# Patient Record
Sex: MALE | Race: WHITE | NOT HISPANIC OR LATINO
[De-identification: names, ages, dates, MRNs, and addresses within clinical notes are randomized per-mention and may not be internally consistent; named-entity substitution may affect disease eponyms.]

---

## 2017-03-21 ENCOUNTER — NON-APPOINTMENT (OUTPATIENT)
Age: 82
End: 2017-03-21

## 2017-03-21 ENCOUNTER — LABORATORY RESULT (OUTPATIENT)
Age: 82
End: 2017-03-21

## 2017-03-21 ENCOUNTER — APPOINTMENT (OUTPATIENT)
Dept: INTERNAL MEDICINE | Facility: CLINIC | Age: 82
End: 2017-03-21

## 2017-03-21 VITALS — HEIGHT: 61.5 IN

## 2017-03-21 VITALS
HEART RATE: 72 BPM | HEIGHT: 61.5 IN | BODY MASS INDEX: 24.79 KG/M2 | OXYGEN SATURATION: 99 % | WEIGHT: 133 LBS | SYSTOLIC BLOOD PRESSURE: 122 MMHG | DIASTOLIC BLOOD PRESSURE: 78 MMHG

## 2017-03-21 DIAGNOSIS — H91.93 UNSPECIFIED HEARING LOSS, BILATERAL: ICD-10-CM

## 2017-03-21 DIAGNOSIS — H72.92 UNSPECIFIED PERFORATION OF TYMPANIC MEMBRANE, LEFT EAR: ICD-10-CM

## 2017-03-21 DIAGNOSIS — Z82.49 FAMILY HISTORY OF ISCHEMIC HEART DISEASE AND OTHER DISEASES OF THE CIRCULATORY SYSTEM: ICD-10-CM

## 2017-03-21 RX ORDER — ASPIRIN 81 MG/1
81 TABLET, CHEWABLE ORAL
Refills: 0 | Status: ACTIVE | COMMUNITY

## 2017-03-28 ENCOUNTER — MEDICATION RENEWAL (OUTPATIENT)
Age: 82
End: 2017-03-28

## 2017-03-28 LAB
ALBUMIN SERPL ELPH-MCNC: 4.1 G/DL
ALP BLD-CCNC: 67 U/L
ALT SERPL-CCNC: 11 U/L
ANION GAP SERPL CALC-SCNC: 17 MMOL/L
AST SERPL-CCNC: 15 U/L
BASOPHILS # BLD AUTO: 0.14 K/UL
BASOPHILS NFR BLD AUTO: 1.8 %
BILIRUB SERPL-MCNC: 0.5 MG/DL
BUN SERPL-MCNC: 31 MG/DL
CALCIUM SERPL-MCNC: 9.9 MG/DL
CHLORIDE SERPL-SCNC: 104 MMOL/L
CHOLEST SERPL-MCNC: 162 MG/DL
CHOLEST/HDLC SERPL: 3.8 RATIO
CO2 SERPL-SCNC: 22 MMOL/L
CREAT SERPL-MCNC: 1.22 MG/DL
EOSINOPHIL # BLD AUTO: 0.21 K/UL
EOSINOPHIL NFR BLD AUTO: 2.7 %
GLUCOSE SERPL-MCNC: 97 MG/DL
HCT VFR BLD CALC: 46 %
HDLC SERPL-MCNC: 43 MG/DL
HGB BLD-MCNC: 15.4 G/DL
LDLC SERPL CALC-MCNC: 96 MG/DL
LYMPHOCYTES # BLD AUTO: 1.67 K/UL
LYMPHOCYTES NFR BLD AUTO: 21.4 %
MAN DIFF?: NORMAL
MCHC RBC-ENTMCNC: 33.5 GM/DL
MCHC RBC-ENTMCNC: 35.9 PG
MCV RBC AUTO: 107.2 FL
MONOCYTES # BLD AUTO: 0.49 K/UL
MONOCYTES NFR BLD AUTO: 6.3 %
NEUTROPHILS # BLD AUTO: 4.87 K/UL
NEUTROPHILS NFR BLD AUTO: 62.4 %
PLATELET # BLD AUTO: 299 K/UL
POTASSIUM SERPL-SCNC: 5.3 MMOL/L
PROT SERPL-MCNC: 7.4 G/DL
RBC # BLD: 4.29 M/UL
RBC # FLD: 14.1 %
SODIUM SERPL-SCNC: 143 MMOL/L
TRIGL SERPL-MCNC: 116 MG/DL
TSH SERPL-ACNC: 2.29 UIU/ML
WBC # FLD AUTO: 7.81 K/UL

## 2017-06-05 ENCOUNTER — MEDICATION RENEWAL (OUTPATIENT)
Age: 82
End: 2017-06-05

## 2017-06-09 ENCOUNTER — MEDICATION RENEWAL (OUTPATIENT)
Age: 82
End: 2017-06-09

## 2017-10-05 ENCOUNTER — RX RENEWAL (OUTPATIENT)
Age: 82
End: 2017-10-05

## 2018-02-07 ENCOUNTER — MEDICATION RENEWAL (OUTPATIENT)
Age: 83
End: 2018-02-07

## 2018-03-19 ENCOUNTER — RX RENEWAL (OUTPATIENT)
Age: 83
End: 2018-03-19

## 2018-04-12 ENCOUNTER — LABORATORY RESULT (OUTPATIENT)
Age: 83
End: 2018-04-12

## 2018-04-12 ENCOUNTER — APPOINTMENT (OUTPATIENT)
Dept: INTERNAL MEDICINE | Facility: CLINIC | Age: 83
End: 2018-04-12
Payer: MEDICARE

## 2018-04-12 ENCOUNTER — NON-APPOINTMENT (OUTPATIENT)
Age: 83
End: 2018-04-12

## 2018-04-12 VITALS
HEIGHT: 61 IN | WEIGHT: 131.39 LBS | TEMPERATURE: 97.7 F | OXYGEN SATURATION: 96 % | SYSTOLIC BLOOD PRESSURE: 142 MMHG | HEART RATE: 63 BPM | DIASTOLIC BLOOD PRESSURE: 69 MMHG | BODY MASS INDEX: 24.81 KG/M2

## 2018-04-12 DIAGNOSIS — Z87.09 PERSONAL HISTORY OF OTHER DISEASES OF THE RESPIRATORY SYSTEM: ICD-10-CM

## 2018-04-12 PROCEDURE — 36415 COLL VENOUS BLD VENIPUNCTURE: CPT

## 2018-04-12 PROCEDURE — 93000 ELECTROCARDIOGRAM COMPLETE: CPT

## 2018-04-12 PROCEDURE — G0439: CPT

## 2018-04-12 PROCEDURE — G0444 DEPRESSION SCREEN ANNUAL: CPT

## 2018-04-12 RX ORDER — OSELTAMIVIR PHOSPHATE 75 MG/1
75 CAPSULE ORAL
Qty: 10 | Refills: 0 | Status: DISCONTINUED | COMMUNITY
Start: 2018-02-07 | End: 2018-04-12

## 2018-04-13 LAB
BASOPHILS # BLD AUTO: 0.15 K/UL
BASOPHILS NFR BLD AUTO: 1.8 %
EOSINOPHIL # BLD AUTO: 0.17 K/UL
EOSINOPHIL NFR BLD AUTO: 2 %
HCT VFR BLD CALC: 41.5 %
HGB BLD-MCNC: 14.8 G/DL
IMM GRANULOCYTES NFR BLD AUTO: 0.1 %
LYMPHOCYTES # BLD AUTO: 2.24 K/UL
LYMPHOCYTES NFR BLD AUTO: 26.5 %
MAN DIFF?: NORMAL
MCHC RBC-ENTMCNC: 35.7 GM/DL
MCHC RBC-ENTMCNC: 37.4 PG
MCV RBC AUTO: 104.8 FL
MONOCYTES # BLD AUTO: 0.76 K/UL
MONOCYTES NFR BLD AUTO: 9 %
NEUTROPHILS # BLD AUTO: 5.11 K/UL
NEUTROPHILS NFR BLD AUTO: 60.6 %
PLATELET # BLD AUTO: 299 K/UL
RBC # BLD: 3.96 M/UL
RBC # FLD: 14.7 %
WBC # FLD AUTO: 8.44 K/UL

## 2018-04-16 LAB
ALBUMIN SERPL ELPH-MCNC: 4.1 G/DL
ALP BLD-CCNC: 59 U/L
ALT SERPL-CCNC: 6 U/L
ANION GAP SERPL CALC-SCNC: 13 MMOL/L
AST SERPL-CCNC: 19 U/L
BILIRUB SERPL-MCNC: 0.6 MG/DL
BUN SERPL-MCNC: 27 MG/DL
CALCIUM SERPL-MCNC: 9.9 MG/DL
CHLORIDE SERPL-SCNC: 104 MMOL/L
CHOLEST SERPL-MCNC: 160 MG/DL
CHOLEST/HDLC SERPL: 4.2 RATIO
CO2 SERPL-SCNC: 24 MMOL/L
CREAT SERPL-MCNC: 1.23 MG/DL
GLUCOSE SERPL-MCNC: 91 MG/DL
HDLC SERPL-MCNC: 38 MG/DL
LDLC SERPL CALC-MCNC: 99 MG/DL
POTASSIUM SERPL-SCNC: 5.2 MMOL/L
PROT SERPL-MCNC: 7.9 G/DL
SODIUM SERPL-SCNC: 141 MMOL/L
TRIGL SERPL-MCNC: 113 MG/DL
TSH SERPL-ACNC: 2.46 UIU/ML

## 2018-06-11 ENCOUNTER — RX RENEWAL (OUTPATIENT)
Age: 83
End: 2018-06-11

## 2018-09-26 ENCOUNTER — RX RENEWAL (OUTPATIENT)
Age: 83
End: 2018-09-26

## 2018-10-07 ENCOUNTER — FORM ENCOUNTER (OUTPATIENT)
Age: 83
End: 2018-10-07

## 2018-10-08 ENCOUNTER — APPOINTMENT (OUTPATIENT)
Dept: ORTHOPEDIC SURGERY | Facility: CLINIC | Age: 83
End: 2018-10-08
Payer: MEDICARE

## 2018-10-08 ENCOUNTER — OUTPATIENT (OUTPATIENT)
Dept: OUTPATIENT SERVICES | Facility: HOSPITAL | Age: 83
LOS: 1 days | End: 2018-10-08
Payer: MEDICARE

## 2018-10-08 ENCOUNTER — APPOINTMENT (OUTPATIENT)
Dept: RADIOLOGY | Facility: CLINIC | Age: 83
End: 2018-10-08

## 2018-10-08 VITALS — RESPIRATION RATE: 16 BRPM | HEIGHT: 61 IN | BODY MASS INDEX: 24.73 KG/M2 | WEIGHT: 131 LBS

## 2018-10-08 PROCEDURE — 73564 X-RAY EXAM KNEE 4 OR MORE: CPT

## 2018-10-08 PROCEDURE — 20611 DRAIN/INJ JOINT/BURSA W/US: CPT | Mod: 50

## 2018-10-08 PROCEDURE — 99204 OFFICE O/P NEW MOD 45 MIN: CPT | Mod: 25

## 2018-10-08 PROCEDURE — 73564 X-RAY EXAM KNEE 4 OR MORE: CPT | Mod: 26,50

## 2018-12-28 ENCOUNTER — MEDICATION RENEWAL (OUTPATIENT)
Age: 83
End: 2018-12-28

## 2019-03-11 ENCOUNTER — RX RENEWAL (OUTPATIENT)
Age: 84
End: 2019-03-11

## 2019-05-01 ENCOUNTER — LABORATORY RESULT (OUTPATIENT)
Age: 84
End: 2019-05-01

## 2019-05-01 ENCOUNTER — APPOINTMENT (OUTPATIENT)
Dept: INTERNAL MEDICINE | Facility: CLINIC | Age: 84
End: 2019-05-01
Payer: MEDICARE

## 2019-05-01 ENCOUNTER — NON-APPOINTMENT (OUTPATIENT)
Age: 84
End: 2019-05-01

## 2019-05-01 VITALS
OXYGEN SATURATION: 96 % | SYSTOLIC BLOOD PRESSURE: 122 MMHG | HEIGHT: 61 IN | DIASTOLIC BLOOD PRESSURE: 63 MMHG | HEART RATE: 71 BPM | WEIGHT: 132 LBS | BODY MASS INDEX: 24.92 KG/M2

## 2019-05-01 DIAGNOSIS — Z71.89 OTHER SPECIFIED COUNSELING: ICD-10-CM

## 2019-05-01 PROCEDURE — G0444 DEPRESSION SCREEN ANNUAL: CPT | Mod: 59

## 2019-05-01 PROCEDURE — 90670 PCV13 VACCINE IM: CPT

## 2019-05-01 PROCEDURE — 93000 ELECTROCARDIOGRAM COMPLETE: CPT

## 2019-05-01 PROCEDURE — 99497 ADVNCD CARE PLAN 30 MIN: CPT | Mod: 25

## 2019-05-01 PROCEDURE — G0439: CPT

## 2019-05-01 PROCEDURE — 36415 COLL VENOUS BLD VENIPUNCTURE: CPT

## 2019-05-01 PROCEDURE — G0009: CPT

## 2019-05-01 NOTE — HEALTH RISK ASSESSMENT
[Very Good] : ~his/her~  mood as very good [No falls in past year] : Patient reported no falls in the past year [0] : 2) Feeling down, depressed, or hopeless: Not at all (0) [None] : None [With Significant Other] : lives with significant other [Retired] : retired [] :  [Feels Safe at Home] : Feels safe at home [Fully functional (bathing, dressing, toileting, transferring, walking, feeding)] : Fully functional (bathing, dressing, toileting, transferring, walking, feeding) [Fully functional (using the telephone, shopping, preparing meals, housekeeping, doing laundry, using] : Fully functional and needs no help or supervision to perform IADLs (using the telephone, shopping, preparing meals, housekeeping, doing laundry, using transportation, managing medications and managing finances) [Designated Healthcare Proxy] : Designated healthcare proxy [Relationship: ___] : Relationship: [unfilled] [Name: ___] : Health Care Proxy's Name: [unfilled]  [] : No [[id number]] : 0 [Reports changes in hearing] : Reports no changes in hearing [Language] : denies difficulty with language [Change in mental status noted] : No change in mental status noted [Reports changes in dental health] : Reports no changes in dental health [Reports changes in vision] : Reports no changes in vision [FreeTextEntry4] : [place] given for review and signing.  16 minutes d/w pt and wife

## 2019-05-01 NOTE — HISTORY OF PRESENT ILLNESS
[de-identified] : No nausea, vomiting, diarrhea, and constipation. No chest pain or shortness of breath.\par Walks daily.  Slower then when younger.  More careful.  Appetite good.

## 2019-05-01 NOTE — PHYSICAL EXAM
[No Acute Distress] : no acute distress [Well Nourished] : well nourished [Well Developed] : well developed [Well-Appearing] : well-appearing [Normal Sclera/Conjunctiva] : normal sclera/conjunctiva [PERRL] : pupils equal round and reactive to light [EOMI] : extraocular movements intact [Normal Outer Ear/Nose] : the outer ears and nose were normal in appearance [Normal Oropharynx] : the oropharynx was normal [No JVD] : no jugular venous distention [Supple] : supple [No Lymphadenopathy] : no lymphadenopathy [Thyroid Normal, No Nodules] : the thyroid was normal and there were no nodules present [No Respiratory Distress] : no respiratory distress  [Clear to Auscultation] : lungs were clear to auscultation bilaterally [No Accessory Muscle Use] : no accessory muscle use [Normal Rate] : normal rate  [Normal S1, S2] : normal S1 and S2 [Regular Rhythm] : with a regular rhythm [No Murmur] : no murmur heard [No Carotid Bruits] : no carotid bruits [No Abdominal Bruit] : a ~M bruit was not heard ~T in the abdomen [No Varicosities] : no varicosities [No Edema] : there was no peripheral edema [Pedal Pulses Present] : the pedal pulses are present [No Extremity Clubbing/Cyanosis] : no extremity clubbing/cyanosis [No Palpable Aorta] : no palpable aorta [Soft] : abdomen soft [Non Tender] : non-tender [Non-distended] : non-distended [No Masses] : no abdominal mass palpated [No HSM] : no HSM [Normal Bowel Sounds] : normal bowel sounds [Normal Posterior Cervical Nodes] : no posterior cervical lymphadenopathy [Normal Anterior Cervical Nodes] : no anterior cervical lymphadenopathy [No CVA Tenderness] : no CVA  tenderness [No Spinal Tenderness] : no spinal tenderness [Grossly Normal Strength/Tone] : grossly normal strength/tone [No Joint Swelling] : no joint swelling [No Rash] : no rash [Normal Gait] : normal gait [Coordination Grossly Intact] : coordination grossly intact [No Focal Deficits] : no focal deficits [Deep Tendon Reflexes (DTR)] : deep tendon reflexes were 2+ and symmetric [Normal Insight/Judgement] : insight and judgment were intact [Normal Affect] : the affect was normal

## 2019-05-08 LAB
ALBUMIN SERPL ELPH-MCNC: 4 G/DL
ALP BLD-CCNC: 64 U/L
ALT SERPL-CCNC: 13 U/L
ANION GAP SERPL CALC-SCNC: 12 MMOL/L
AST SERPL-CCNC: 16 U/L
BASOPHILS # BLD AUTO: 0.12 K/UL
BASOPHILS NFR BLD AUTO: 1.4 %
BILIRUB SERPL-MCNC: 0.5 MG/DL
BUN SERPL-MCNC: 25 MG/DL
CALCIUM SERPL-MCNC: 9 MG/DL
CHLORIDE SERPL-SCNC: 108 MMOL/L
CHOLEST SERPL-MCNC: 145 MG/DL
CHOLEST/HDLC SERPL: 3.8 RATIO
CO2 SERPL-SCNC: 23 MMOL/L
CREAT SERPL-MCNC: 1.06 MG/DL
EOSINOPHIL # BLD AUTO: 0.21 K/UL
EOSINOPHIL NFR BLD AUTO: 2.5 %
GLUCOSE SERPL-MCNC: 96 MG/DL
HCT VFR BLD CALC: 42.3 %
HDLC SERPL-MCNC: 38 MG/DL
HGB BLD-MCNC: 14.2 G/DL
IMM GRANULOCYTES NFR BLD AUTO: 0.2 %
LDLC SERPL CALC-MCNC: 85 MG/DL
LYMPHOCYTES # BLD AUTO: 1.83 K/UL
LYMPHOCYTES NFR BLD AUTO: 22.1 %
MAN DIFF?: NORMAL
MCHC RBC-ENTMCNC: 33.6 GM/DL
MCHC RBC-ENTMCNC: 36.5 PG
MCV RBC AUTO: 108.7 FL
MONOCYTES # BLD AUTO: 0.78 K/UL
MONOCYTES NFR BLD AUTO: 9.4 %
NEUTROPHILS # BLD AUTO: 5.32 K/UL
NEUTROPHILS NFR BLD AUTO: 64.4 %
PLATELET # BLD AUTO: 289 K/UL
POTASSIUM SERPL-SCNC: 4 MMOL/L
PROT SERPL-MCNC: 7 G/DL
RBC # BLD: 3.89 M/UL
RBC # FLD: 13.9 %
SODIUM SERPL-SCNC: 143 MMOL/L
TRIGL SERPL-MCNC: 108 MG/DL
TSH SERPL-ACNC: 2.03 UIU/ML
WBC # FLD AUTO: 8.28 K/UL

## 2019-06-04 ENCOUNTER — RX RENEWAL (OUTPATIENT)
Age: 84
End: 2019-06-04

## 2019-06-10 ENCOUNTER — RX RENEWAL (OUTPATIENT)
Age: 84
End: 2019-06-10

## 2019-10-03 ENCOUNTER — MEDICATION RENEWAL (OUTPATIENT)
Age: 84
End: 2019-10-03

## 2019-11-20 ENCOUNTER — APPOINTMENT (OUTPATIENT)
Dept: ORTHOPEDIC SURGERY | Facility: CLINIC | Age: 84
End: 2019-11-20
Payer: MEDICARE

## 2019-11-20 VITALS — WEIGHT: 135 LBS | BODY MASS INDEX: 23.92 KG/M2 | HEIGHT: 63 IN

## 2019-11-20 PROCEDURE — 99214 OFFICE O/P EST MOD 30 MIN: CPT

## 2019-11-20 PROCEDURE — 73562 X-RAY EXAM OF KNEE 3: CPT | Mod: 50

## 2019-12-02 ENCOUNTER — RX RENEWAL (OUTPATIENT)
Age: 84
End: 2019-12-02

## 2020-02-11 ENCOUNTER — OUTPATIENT (OUTPATIENT)
Dept: OUTPATIENT SERVICES | Facility: HOSPITAL | Age: 85
LOS: 1 days | End: 2020-02-11
Payer: MEDICARE

## 2020-02-11 VITALS
OXYGEN SATURATION: 98 % | HEART RATE: 59 BPM | SYSTOLIC BLOOD PRESSURE: 163 MMHG | TEMPERATURE: 98 F | HEIGHT: 75 IN | RESPIRATION RATE: 18 BRPM | DIASTOLIC BLOOD PRESSURE: 66 MMHG | WEIGHT: 132.06 LBS

## 2020-02-11 DIAGNOSIS — Z98.890 OTHER SPECIFIED POSTPROCEDURAL STATES: Chronic | ICD-10-CM

## 2020-02-11 DIAGNOSIS — M17.11 UNILATERAL PRIMARY OSTEOARTHRITIS, RIGHT KNEE: ICD-10-CM

## 2020-02-11 DIAGNOSIS — Z01.818 ENCOUNTER FOR OTHER PREPROCEDURAL EXAMINATION: ICD-10-CM

## 2020-02-11 LAB
ALBUMIN SERPL ELPH-MCNC: 3.5 G/DL — SIGNIFICANT CHANGE UP (ref 3.3–5)
ALP SERPL-CCNC: 64 U/L — SIGNIFICANT CHANGE UP (ref 30–120)
ALT FLD-CCNC: 33 U/L DA — SIGNIFICANT CHANGE UP (ref 10–60)
ANION GAP SERPL CALC-SCNC: 6 MMOL/L — SIGNIFICANT CHANGE UP (ref 5–17)
APTT BLD: 26.7 SEC — LOW (ref 28.5–37)
AST SERPL-CCNC: 20 U/L — SIGNIFICANT CHANGE UP (ref 10–40)
BILIRUB SERPL-MCNC: 0.2 MG/DL — SIGNIFICANT CHANGE UP (ref 0.2–1.2)
BLD GP AB SCN SERPL QL: SIGNIFICANT CHANGE UP
BUN SERPL-MCNC: 36 MG/DL — HIGH (ref 7–23)
CALCIUM SERPL-MCNC: 9.3 MG/DL — SIGNIFICANT CHANGE UP (ref 8.4–10.5)
CHLORIDE SERPL-SCNC: 110 MMOL/L — HIGH (ref 96–108)
CO2 SERPL-SCNC: 31 MMOL/L — SIGNIFICANT CHANGE UP (ref 22–31)
CREAT SERPL-MCNC: 1.12 MG/DL — SIGNIFICANT CHANGE UP (ref 0.5–1.3)
GLUCOSE SERPL-MCNC: 110 MG/DL — HIGH (ref 70–99)
HCT VFR BLD CALC: 44.2 % — SIGNIFICANT CHANGE UP (ref 39–50)
HGB BLD-MCNC: 14.6 G/DL — SIGNIFICANT CHANGE UP (ref 13–17)
INR BLD: 0.98 RATIO — SIGNIFICANT CHANGE UP (ref 0.88–1.16)
MCHC RBC-ENTMCNC: 33 GM/DL — SIGNIFICANT CHANGE UP (ref 32–36)
MCHC RBC-ENTMCNC: 35.4 PG — HIGH (ref 27–34)
MCV RBC AUTO: 107.3 FL — HIGH (ref 80–100)
MRSA PCR RESULT.: SIGNIFICANT CHANGE UP
NRBC # BLD: 0 /100 WBCS — SIGNIFICANT CHANGE UP (ref 0–0)
PLATELET # BLD AUTO: 289 K/UL — SIGNIFICANT CHANGE UP (ref 150–400)
POTASSIUM SERPL-MCNC: 5 MMOL/L — SIGNIFICANT CHANGE UP (ref 3.5–5.3)
POTASSIUM SERPL-SCNC: 5 MMOL/L — SIGNIFICANT CHANGE UP (ref 3.5–5.3)
PROT SERPL-MCNC: 7.9 G/DL — SIGNIFICANT CHANGE UP (ref 6–8.3)
PROTHROM AB SERPL-ACNC: 10.8 SEC — SIGNIFICANT CHANGE UP (ref 10–12.9)
RBC # BLD: 4.12 M/UL — LOW (ref 4.2–5.8)
RBC # FLD: 13.1 % — SIGNIFICANT CHANGE UP (ref 10.3–14.5)
S AUREUS DNA NOSE QL NAA+PROBE: DETECTED
SODIUM SERPL-SCNC: 147 MMOL/L — HIGH (ref 135–145)
WBC # BLD: 8.72 K/UL — SIGNIFICANT CHANGE UP (ref 3.8–10.5)
WBC # FLD AUTO: 8.72 K/UL — SIGNIFICANT CHANGE UP (ref 3.8–10.5)

## 2020-02-11 PROCEDURE — G0463: CPT

## 2020-02-11 PROCEDURE — 93005 ELECTROCARDIOGRAM TRACING: CPT

## 2020-02-11 PROCEDURE — 93010 ELECTROCARDIOGRAM REPORT: CPT

## 2020-02-11 NOTE — H&P PST ADULT - ASSESSMENT
89 y/o male with right knee pain-OA  Planned surg leena.- right knee replacement 2/25/20  Will obtain medical clearance  Pre op instructions provided  Instructions provided on medications to continue and to take the day morning of surgery

## 2020-02-11 NOTE — H&P PST ADULT - NSICDXPASTMEDICALHX_GEN_ALL_CORE_FT
PAST MEDICAL HISTORY:  H/O: glaucoma     [place] (hard of hearing)     HTN (hypertension)     Hypothyroid

## 2020-02-12 ENCOUNTER — APPOINTMENT (OUTPATIENT)
Dept: INTERNAL MEDICINE | Facility: CLINIC | Age: 85
End: 2020-02-12

## 2020-02-12 RX ORDER — MUPIROCIN 20 MG/G
1 OINTMENT TOPICAL
Qty: 1 | Refills: 0
Start: 2020-02-12 | End: 2020-02-16

## 2020-02-12 NOTE — PROGRESS NOTE ADULT - SUBJECTIVE AND OBJECTIVE BOX
Nose culture positive for MSSA. Mupirocin ointment 2 % escribed and sent to patient's pharmacy and to be used twice a day for 5 consecutive days. Called patient and spoke to his daughter at his request and treatment protocol reviewed and questions answered and she verbalized understanding

## 2020-02-13 ENCOUNTER — FORM ENCOUNTER (OUTPATIENT)
Age: 85
End: 2020-02-13

## 2020-02-14 ENCOUNTER — APPOINTMENT (OUTPATIENT)
Dept: INTERNAL MEDICINE | Facility: CLINIC | Age: 85
End: 2020-02-14
Payer: MEDICARE

## 2020-02-14 ENCOUNTER — LABORATORY RESULT (OUTPATIENT)
Age: 85
End: 2020-02-14

## 2020-02-14 ENCOUNTER — OUTPATIENT (OUTPATIENT)
Dept: OUTPATIENT SERVICES | Facility: HOSPITAL | Age: 85
LOS: 1 days | End: 2020-02-14
Payer: MEDICARE

## 2020-02-14 ENCOUNTER — APPOINTMENT (OUTPATIENT)
Dept: RADIOLOGY | Facility: CLINIC | Age: 85
End: 2020-02-14

## 2020-02-14 VITALS
TEMPERATURE: 97.9 F | WEIGHT: 135 LBS | SYSTOLIC BLOOD PRESSURE: 136 MMHG | OXYGEN SATURATION: 97 % | HEART RATE: 64 BPM | DIASTOLIC BLOOD PRESSURE: 67 MMHG | HEIGHT: 63 IN | BODY MASS INDEX: 23.92 KG/M2

## 2020-02-14 DIAGNOSIS — Z98.890 OTHER SPECIFIED POSTPROCEDURAL STATES: Chronic | ICD-10-CM

## 2020-02-14 PROBLEM — Z86.69 PERSONAL HISTORY OF OTHER DISEASES OF THE NERVOUS SYSTEM AND SENSE ORGANS: Chronic | Status: ACTIVE | Noted: 2020-02-11

## 2020-02-14 PROBLEM — H91.90 UNSPECIFIED HEARING LOSS, UNSPECIFIED EAR: Chronic | Status: ACTIVE | Noted: 2020-02-11

## 2020-02-14 PROCEDURE — 99203 OFFICE O/P NEW LOW 30 MIN: CPT | Mod: 25

## 2020-02-14 PROCEDURE — 36415 COLL VENOUS BLD VENIPUNCTURE: CPT

## 2020-02-14 PROCEDURE — 71046 X-RAY EXAM CHEST 2 VIEWS: CPT | Mod: 26

## 2020-02-14 NOTE — HISTORY OF PRESENT ILLNESS
[No Pertinent Cardiac History] : no history of aortic stenosis, atrial fibrillation, coronary artery disease, recent myocardial infarction, or implantable device/pacemaker [No Pertinent Pulmonary History] : no history of asthma, COPD, sleep apnea, or smoking [No Adverse Anesthesia Reaction] : no adverse anesthesia reaction in self or family member [Spouse] : spouse [Chronic Anticoagulation] : no chronic anticoagulation [Chronic Kidney Disease] : no chronic kidney disease [Diabetes] : no diabetes [FreeTextEntry4] : Here for medical clearance for Right TKR by Dr Ronald Lomeli on 2/25/2020\par h//o HTN, HLD, Hypothyroidism, SNHL \par walks several miles a day- \par no CP/SOB/palpitation/dizziness/claudication [FreeTextEntry7] : EKG SB 1st degree AVB

## 2020-02-14 NOTE — REVIEW OF SYSTEMS
[Joint Pain] : joint pain [Hearing Loss] : hearing loss [Negative] : Heme/Lymph [Fatigue] : no fatigue [Vision Problems] : no vision problems

## 2020-02-14 NOTE — PHYSICAL EXAM
[Well Nourished] : well nourished [Normal Sclera/Conjunctiva] : normal sclera/conjunctiva [Normal Oropharynx] : the oropharynx was normal [No Lymphadenopathy] : no lymphadenopathy [Clear to Auscultation] : lungs were clear to auscultation bilaterally [Normal Rate] : normal rate  [Regular Rhythm] : with a regular rhythm [No CVA Tenderness] : no CVA  tenderness [Soft] : abdomen soft [Non Tender] : non-tender [No Joint Swelling] : no joint swelling [No Rash] : no rash [Normal] : normal gait, coordination grossly intact, no focal deficits and deep tendon reflexes were 2+ and symmetric

## 2020-02-14 NOTE — PLAN
[FreeTextEntry1] : 89 yo old very active gentleman w h/o HTN, HLD, Hypothyroidism, some hearing loss scheduled for Right TKR- normal exam- EKG SB 1st degree AVB- asymptomatic- \par \par plan:  F/u Cardio, check pre-op labs/EKG\par \par cleared pending labs and Cardio evaluation

## 2020-02-18 ENCOUNTER — APPOINTMENT (OUTPATIENT)
Dept: HEART AND VASCULAR | Facility: CLINIC | Age: 85
End: 2020-02-18
Payer: MEDICARE

## 2020-02-18 LAB
ALBUMIN SERPL ELPH-MCNC: 4.4 G/DL
ALP BLD-CCNC: 66 U/L
ALT SERPL-CCNC: 18 U/L
ANION GAP SERPL CALC-SCNC: 13 MMOL/L
APPEARANCE: CLEAR
AST SERPL-CCNC: 17 U/L
BACTERIA: NEGATIVE
BASOPHILS # BLD AUTO: 0.3 K/UL
BASOPHILS NFR BLD AUTO: 3.6 %
BILIRUB SERPL-MCNC: 0.6 MG/DL
BILIRUBIN URINE: NEGATIVE
BLOOD URINE: NEGATIVE
BUN SERPL-MCNC: 22 MG/DL
CALCIUM SERPL-MCNC: 9.6 MG/DL
CHLORIDE SERPL-SCNC: 105 MMOL/L
CO2 SERPL-SCNC: 24 MMOL/L
COLOR: YELLOW
CREAT SERPL-MCNC: 1.05 MG/DL
EOSINOPHIL # BLD AUTO: 0.15 K/UL
EOSINOPHIL NFR BLD AUTO: 1.8 %
GLUCOSE QUALITATIVE U: NEGATIVE
GLUCOSE SERPL-MCNC: 103 MG/DL
HCT VFR BLD CALC: 43.1 %
HGB BLD-MCNC: 14.5 G/DL
HYALINE CASTS: 0 /LPF
INR PPP: 0.97 RATIO
KETONES URINE: NEGATIVE
LEUKOCYTE ESTERASE URINE: NEGATIVE
LYMPHOCYTES # BLD AUTO: 2.25 K/UL
LYMPHOCYTES NFR BLD AUTO: 26.8 %
MAN DIFF?: NORMAL
MCHC RBC-ENTMCNC: 33.6 GM/DL
MCHC RBC-ENTMCNC: 37.6 PG
MCV RBC AUTO: 111.7 FL
MICROSCOPIC-UA: NORMAL
MONOCYTES # BLD AUTO: 0.67 K/UL
MONOCYTES NFR BLD AUTO: 8 %
NEUTROPHILS # BLD AUTO: 4.94 K/UL
NEUTROPHILS NFR BLD AUTO: 58.9 %
NITRITE URINE: NEGATIVE
PH URINE: 6.5
PLATELET # BLD AUTO: 319 K/UL
POTASSIUM SERPL-SCNC: 4.6 MMOL/L
PROT SERPL-MCNC: 7.4 G/DL
PROTEIN URINE: NEGATIVE
PT BLD: 11.1 SEC
RBC # BLD: 3.86 M/UL
RBC # FLD: 13.7 %
RED BLOOD CELLS URINE: 1 /HPF
SODIUM SERPL-SCNC: 142 MMOL/L
SPECIFIC GRAVITY URINE: 1.01
SQUAMOUS EPITHELIAL CELLS: 0 /HPF
TSH SERPL-ACNC: 2.84 UIU/ML
UROBILINOGEN URINE: NORMAL
WBC # FLD AUTO: 8.38 K/UL
WHITE BLOOD CELLS URINE: 0 /HPF

## 2020-02-18 PROCEDURE — 99204 OFFICE O/P NEW MOD 45 MIN: CPT

## 2020-02-18 PROCEDURE — 93306 TTE W/DOPPLER COMPLETE: CPT

## 2020-02-18 NOTE — DISCUSSION/SUMMARY
[Procedure Intermediate Risk] : the procedure risk is intermediate [Patient Intermediate Risk] : the patient is an intermediate risk [Optimized for Surgery] : the patient is optimized for surgery [FreeTextEntry3] : b/p medications  [Continue] : Continue medications as currently directed [FreeTextEntry1] : Pre-op From cardiovascular standpoint, Mr. ELISE is of acceptable risk for the planned procedure and may move forward without further cardiovascular testing. [As per surgery] : as per surgery

## 2020-02-18 NOTE — HISTORY OF PRESENT ILLNESS
[Scheduled Procedure ___] : a [unfilled] [Preoperative Visit] : for a medical evaluation prior to surgery [Date of Surgery ___] : on [unfilled] [Surgeon Name ___] : surgeon: [unfilled] [Good] : Good [Chills] : no chills [Fever] : no fever [Cough] : no cough [Chest Pain] : no chest pain [Dyspnea] : no dyspnea [Urinary Frequency] : no urinary frequency [Dysuria] : no dysuria [Nausea] : no nausea [Vomiting] : no vomiting [Abdominal Pain] : no abdominal pain [Diarrhea] : no diarrhea [Lower Extremity Swelling] : no lower extremity swelling [Poor Exercise Tolerance] : no poor exercise tolerance [Easy Bruising] : no easy bruising [Anti-Platelet Agents] : no anti-platelet agents [Cardiovascular Disease] : no cardiovascular disease [Diabetes] : no diabetes [Pulmonary Disease] : no pulmonary disease [Alcohol Use] : no  alcohol use [Nicotine Dependence] : no nicotine dependence [Renal Disease] : no renal disease [Sleep Apnea] : no sleep apnea [GI Disease] : no gastrointestinal disease [Thromboembolic Problems] : no thromboembolic problems [Transfusion Reaction] : no transfusion reaction [Frequent use of NSAIDs] : no use of NSAIDs [Steroid Use in Last 6 Months] : no steroid use in the last six months [Impaired Immunity] : no impaired immunity [Prior Anesthesia] : No prior anesthesia [Prev Anesthesia Reaction] : no previous anesthesia reaction [Frequent Aspirin Use] : no frequent aspirin use [Electrocardiogram] : ~T an ECG ~C was performed [Echocardiogram] : ~T an echocardiogram ~C was performed [Anesthesia Reaction] : no anesthesia reaction [Sudden Death] : no sudden death [de-identified] : Dr Lomeli [Bleeding Disorder] : no bleeding disorder [Clotting Disorder] : no clotting disorder [FreeTextEntry1] : Mr. ELISE presents for a follow up today. He denies chest pain, dyspnea or palpitations. No issues reported with his medications. Otherwise, he is feeling well. Completed an echocardiogram today. \par

## 2020-02-18 NOTE — PHYSICAL EXAM
[General Appearance - Well Developed] : well developed [Normal Appearance] : normal appearance [Well Groomed] : well groomed [No Deformities] : no deformities [General Appearance - In No Acute Distress] : no acute distress [General Appearance - Well Nourished] : well nourished [Eyelids - No Xanthelasma] : the eyelids demonstrated no xanthelasmas [Normal Conjunctiva] : the conjunctiva exhibited no abnormalities [Normal Oral Mucosa] : normal oral mucosa [No Oral Pallor] : no oral pallor [No Oral Cyanosis] : no oral cyanosis [Normal Jugular Venous A Waves Present] : normal jugular venous A waves present [Normal Jugular Venous V Waves Present] : normal jugular venous V waves present [No Jugular Venous Lawson A Waves] : no jugular venous lawson A waves [Respiration, Rhythm And Depth] : normal respiratory rhythm and effort [Exaggerated Use Of Accessory Muscles For Inspiration] : no accessory muscle use [Auscultation Breath Sounds / Voice Sounds] : lungs were clear to auscultation bilaterally [Heart Rate And Rhythm] : heart rate and rhythm were normal [Heart Sounds] : normal S1 and S2 [Murmurs] : no murmurs present [Abdomen Soft] : soft [Abdomen Tenderness] : non-tender [Abnormal Walk] : normal gait [Gait - Sufficient For Exercise Testing] : the gait was sufficient for exercise testing [Abdomen Mass (___ Cm)] : no abdominal mass palpated [Petechial Hemorrhages (___cm)] : no petechial hemorrhages [Nail Clubbing] : no clubbing of the fingernails [Cyanosis, Localized] : no localized cyanosis [] : no rash [Skin Color & Pigmentation] : normal skin color and pigmentation [No Skin Ulcers] : no skin ulcer [Skin Lesions] : no skin lesions [No Venous Stasis] : no venous stasis [No Xanthoma] : no  xanthoma was observed [Affect] : the affect was normal [Oriented To Time, Place, And Person] : oriented to person, place, and time [Mood] : the mood was normal [No Anxiety] : not feeling anxious

## 2020-02-19 ENCOUNTER — APPOINTMENT (OUTPATIENT)
Dept: ORTHOPEDIC SURGERY | Facility: CLINIC | Age: 85
End: 2020-02-19
Payer: MEDICARE

## 2020-02-19 VITALS — WEIGHT: 135 LBS | BODY MASS INDEX: 23.92 KG/M2 | HEIGHT: 63 IN

## 2020-02-19 PROCEDURE — 73562 X-RAY EXAM OF KNEE 3: CPT | Mod: RT

## 2020-02-19 PROCEDURE — 99214 OFFICE O/P EST MOD 30 MIN: CPT

## 2020-02-24 ENCOUNTER — TRANSCRIPTION ENCOUNTER (OUTPATIENT)
Age: 85
End: 2020-02-24

## 2020-02-25 ENCOUNTER — APPOINTMENT (OUTPATIENT)
Dept: ORTHOPEDIC SURGERY | Facility: HOSPITAL | Age: 85
End: 2020-02-25

## 2020-02-25 ENCOUNTER — TRANSCRIPTION ENCOUNTER (OUTPATIENT)
Age: 85
End: 2020-02-25

## 2020-02-25 ENCOUNTER — INPATIENT (INPATIENT)
Facility: HOSPITAL | Age: 85
LOS: 1 days | Discharge: HOME | DRG: 470 | End: 2020-02-27
Attending: SPECIALIST | Admitting: SPECIALIST
Payer: MEDICARE

## 2020-02-25 ENCOUNTER — RESULT REVIEW (OUTPATIENT)
Age: 85
End: 2020-02-25

## 2020-02-25 VITALS
SYSTOLIC BLOOD PRESSURE: 146 MMHG | RESPIRATION RATE: 21 BRPM | OXYGEN SATURATION: 99 % | DIASTOLIC BLOOD PRESSURE: 57 MMHG | HEART RATE: 59 BPM | TEMPERATURE: 97 F | WEIGHT: 130.29 LBS | HEIGHT: 63 IN

## 2020-02-25 DIAGNOSIS — Z98.890 OTHER SPECIFIED POSTPROCEDURAL STATES: Chronic | ICD-10-CM

## 2020-02-25 DIAGNOSIS — M17.11 UNILATERAL PRIMARY OSTEOARTHRITIS, RIGHT KNEE: ICD-10-CM

## 2020-02-25 LAB — ABO RH CONFIRMATION: SIGNIFICANT CHANGE UP

## 2020-02-25 PROCEDURE — 88311 DECALCIFY TISSUE: CPT | Mod: 26

## 2020-02-25 PROCEDURE — 27447 TOTAL KNEE ARTHROPLASTY: CPT | Mod: RT

## 2020-02-25 PROCEDURE — 88305 TISSUE EXAM BY PATHOLOGIST: CPT | Mod: 26

## 2020-02-25 PROCEDURE — 99223 1ST HOSP IP/OBS HIGH 75: CPT

## 2020-02-25 PROCEDURE — 27447 TOTAL KNEE ARTHROPLASTY: CPT | Mod: AS,RT

## 2020-02-25 PROCEDURE — 73562 X-RAY EXAM OF KNEE 3: CPT | Mod: 26,RT

## 2020-02-25 RX ORDER — SENNA PLUS 8.6 MG/1
2 TABLET ORAL AT BEDTIME
Refills: 0 | Status: DISCONTINUED | OUTPATIENT
Start: 2020-02-25 | End: 2020-02-27

## 2020-02-25 RX ORDER — ACETAMINOPHEN 500 MG
1000 TABLET ORAL EVERY 6 HOURS
Refills: 0 | Status: COMPLETED | OUTPATIENT
Start: 2020-02-25 | End: 2020-02-26

## 2020-02-25 RX ORDER — HYDROMORPHONE HYDROCHLORIDE 2 MG/ML
0.5 INJECTION INTRAMUSCULAR; INTRAVENOUS; SUBCUTANEOUS
Refills: 0 | Status: DISCONTINUED | OUTPATIENT
Start: 2020-02-25 | End: 2020-02-27

## 2020-02-25 RX ORDER — CEFAZOLIN SODIUM 1 G
2000 VIAL (EA) INJECTION EVERY 8 HOURS
Refills: 0 | Status: COMPLETED | OUTPATIENT
Start: 2020-02-25 | End: 2020-02-26

## 2020-02-25 RX ORDER — LEVOTHYROXINE SODIUM 125 MCG
50 TABLET ORAL DAILY
Refills: 0 | Status: DISCONTINUED | OUTPATIENT
Start: 2020-02-25 | End: 2020-02-27

## 2020-02-25 RX ORDER — CELECOXIB 200 MG/1
200 CAPSULE ORAL EVERY 12 HOURS
Refills: 0 | Status: DISCONTINUED | OUTPATIENT
Start: 2020-02-25 | End: 2020-02-26

## 2020-02-25 RX ORDER — APREPITANT 80 MG/1
40 CAPSULE ORAL ONCE
Refills: 0 | Status: COMPLETED | OUTPATIENT
Start: 2020-02-25 | End: 2020-02-25

## 2020-02-25 RX ORDER — SODIUM CHLORIDE 9 MG/ML
1000 INJECTION, SOLUTION INTRAVENOUS
Refills: 0 | Status: DISCONTINUED | OUTPATIENT
Start: 2020-02-25 | End: 2020-02-27

## 2020-02-25 RX ORDER — POLYETHYLENE GLYCOL 3350 17 G/17G
17 POWDER, FOR SOLUTION ORAL AT BEDTIME
Refills: 0 | Status: DISCONTINUED | OUTPATIENT
Start: 2020-02-25 | End: 2020-02-27

## 2020-02-25 RX ORDER — MAGNESIUM HYDROXIDE 400 MG/1
30 TABLET, CHEWABLE ORAL DAILY
Refills: 0 | Status: DISCONTINUED | OUTPATIENT
Start: 2020-02-25 | End: 2020-02-27

## 2020-02-25 RX ORDER — CEFAZOLIN SODIUM 1 G
2000 VIAL (EA) INJECTION ONCE
Refills: 0 | Status: COMPLETED | OUTPATIENT
Start: 2020-02-25 | End: 2020-02-25

## 2020-02-25 RX ORDER — ACETAMINOPHEN 500 MG
1000 TABLET ORAL ONCE
Refills: 0 | Status: COMPLETED | OUTPATIENT
Start: 2020-02-25 | End: 2020-02-25

## 2020-02-25 RX ORDER — DORZOLAMIDE HYDROCHLORIDE TIMOLOL MALEATE 20; 5 MG/ML; MG/ML
1 SOLUTION/ DROPS OPHTHALMIC
Refills: 0 | Status: DISCONTINUED | OUTPATIENT
Start: 2020-02-25 | End: 2020-02-27

## 2020-02-25 RX ORDER — SODIUM CHLORIDE 9 MG/ML
1000 INJECTION, SOLUTION INTRAVENOUS
Refills: 0 | Status: DISCONTINUED | OUTPATIENT
Start: 2020-02-25 | End: 2020-02-25

## 2020-02-25 RX ORDER — AMLODIPINE BESYLATE 2.5 MG/1
5 TABLET ORAL DAILY
Refills: 0 | Status: DISCONTINUED | OUTPATIENT
Start: 2020-02-27 | End: 2020-02-27

## 2020-02-25 RX ORDER — ONDANSETRON 8 MG/1
4 TABLET, FILM COATED ORAL ONCE
Refills: 0 | Status: DISCONTINUED | OUTPATIENT
Start: 2020-02-25 | End: 2020-02-25

## 2020-02-25 RX ORDER — SIMVASTATIN 20 MG/1
10 TABLET, FILM COATED ORAL AT BEDTIME
Refills: 0 | Status: DISCONTINUED | OUTPATIENT
Start: 2020-02-25 | End: 2020-02-27

## 2020-02-25 RX ORDER — PANTOPRAZOLE SODIUM 20 MG/1
40 TABLET, DELAYED RELEASE ORAL
Refills: 0 | Status: DISCONTINUED | OUTPATIENT
Start: 2020-02-25 | End: 2020-02-27

## 2020-02-25 RX ORDER — CHLORHEXIDINE GLUCONATE 213 G/1000ML
1 SOLUTION TOPICAL ONCE
Refills: 0 | Status: COMPLETED | OUTPATIENT
Start: 2020-02-25 | End: 2020-02-25

## 2020-02-25 RX ORDER — ASPIRIN/CALCIUM CARB/MAGNESIUM 324 MG
81 TABLET ORAL
Refills: 0 | Status: DISCONTINUED | OUTPATIENT
Start: 2020-02-26 | End: 2020-02-27

## 2020-02-25 RX ORDER — HYDROMORPHONE HYDROCHLORIDE 2 MG/ML
0.5 INJECTION INTRAMUSCULAR; INTRAVENOUS; SUBCUTANEOUS
Refills: 0 | Status: DISCONTINUED | OUTPATIENT
Start: 2020-02-25 | End: 2020-02-25

## 2020-02-25 RX ORDER — ONDANSETRON 8 MG/1
4 TABLET, FILM COATED ORAL EVERY 6 HOURS
Refills: 0 | Status: DISCONTINUED | OUTPATIENT
Start: 2020-02-25 | End: 2020-02-27

## 2020-02-25 RX ORDER — TRANEXAMIC ACID 100 MG/ML
1000 INJECTION, SOLUTION INTRAVENOUS ONCE
Refills: 0 | Status: COMPLETED | OUTPATIENT
Start: 2020-02-25 | End: 2020-02-25

## 2020-02-25 RX ORDER — ACETAMINOPHEN 500 MG
1000 TABLET ORAL EVERY 8 HOURS
Refills: 0 | Status: DISCONTINUED | OUTPATIENT
Start: 2020-02-26 | End: 2020-02-27

## 2020-02-25 RX ORDER — OXYCODONE HYDROCHLORIDE 5 MG/1
10 TABLET ORAL
Refills: 0 | Status: DISCONTINUED | OUTPATIENT
Start: 2020-02-25 | End: 2020-02-26

## 2020-02-25 RX ORDER — OXYCODONE HYDROCHLORIDE 5 MG/1
5 TABLET ORAL
Refills: 0 | Status: DISCONTINUED | OUTPATIENT
Start: 2020-02-25 | End: 2020-02-26

## 2020-02-25 RX ADMIN — Medication 1000 MILLIGRAM(S): at 19:00

## 2020-02-25 RX ADMIN — SIMVASTATIN 10 MILLIGRAM(S): 20 TABLET, FILM COATED ORAL at 21:01

## 2020-02-25 RX ADMIN — CELECOXIB 200 MILLIGRAM(S): 200 CAPSULE ORAL at 21:00

## 2020-02-25 RX ADMIN — SENNA PLUS 2 TABLET(S): 8.6 TABLET ORAL at 21:01

## 2020-02-25 RX ADMIN — SODIUM CHLORIDE 100 MILLILITER(S): 9 INJECTION, SOLUTION INTRAVENOUS at 18:22

## 2020-02-25 RX ADMIN — POLYETHYLENE GLYCOL 3350 17 GRAM(S): 17 POWDER, FOR SOLUTION ORAL at 21:00

## 2020-02-25 RX ADMIN — Medication 400 MILLIGRAM(S): at 23:43

## 2020-02-25 RX ADMIN — CELECOXIB 200 MILLIGRAM(S): 200 CAPSULE ORAL at 21:03

## 2020-02-25 RX ADMIN — Medication 1000 MILLIGRAM(S): at 23:44

## 2020-02-25 RX ADMIN — DORZOLAMIDE HYDROCHLORIDE TIMOLOL MALEATE 1 DROP(S): 20; 5 SOLUTION/ DROPS OPHTHALMIC at 21:01

## 2020-02-25 RX ADMIN — APREPITANT 40 MILLIGRAM(S): 80 CAPSULE ORAL at 09:17

## 2020-02-25 RX ADMIN — CHLORHEXIDINE GLUCONATE 1 APPLICATION(S): 213 SOLUTION TOPICAL at 09:17

## 2020-02-25 RX ADMIN — Medication 100 MILLIGRAM(S): at 21:02

## 2020-02-25 RX ADMIN — Medication 400 MILLIGRAM(S): at 18:22

## 2020-02-25 RX ADMIN — SODIUM CHLORIDE 50 MILLILITER(S): 9 INJECTION, SOLUTION INTRAVENOUS at 15:00

## 2020-02-25 NOTE — DISCHARGE NOTE PROVIDER - CARE PROVIDER_API CALL
Ronald Lomeli)  Orthopaedic Surgery  [address]  [place] [address]  Phone: [phone]  Fax: [phone]  Follow Up Time:

## 2020-02-25 NOTE — DISCHARGE NOTE PROVIDER - NSDCACTIVITY_GEN_ALL_CORE
No heavy lifting/straining/Walking - Indoors allowed/Walking - Outdoors allowed/Do not drive or operate machinery/Stairs allowed

## 2020-02-25 NOTE — DISCHARGE NOTE PROVIDER - INSTRUCTIONS
Regular diet  Pain medicine has been prescribed for you, as needed, and it often causes constipation.  Take docusate sodium (Colace) 100mg 3x daily, while taking narcotic pain medication.   For Constipation :   • Increase your water intake. Drink at least 8 glasses of water daily.  • Try adding fiber to your diet by eating fruits, vegetables and foods that are rich in grains.  • If you do experience constipation, you may take an over-the-counter laxative such as, Senokot, Miralax or  Milk of Magnesia.

## 2020-02-25 NOTE — DISCHARGE NOTE PROVIDER - NSDCCPCAREPLAN_GEN_ALL_CORE_FT
PRINCIPAL DISCHARGE DIAGNOSIS  Diagnosis: Primary osteoarthritis of right knee  Assessment and Plan of Treatment: For your total knee replacement:  Physical Therapy/Occupational Therapy for: ambulation, transfers, stairs, ADL's (activities of daily living), range of motion exercises, and isometrics  -Activity  • Weight Bearing as tolerated with rolling walker.  • Take short, frequent walks increasing the distance that you walk each day as tolerated.  • Change your position every hour to decrease pain and stiffness.  • Continue the exercises taught to you by your physical therapist.  • No driving until cleared by the doctor.  • No tub baths, hot tubs, or swimming pools until instructed by your doctor.  • Do not squat down on the floor.  • Do not kneel or twist your knee.  • Range of Motion Goals: Flexion= 120 degrees, Extension = 0 degrees  Keep incision clean and dry. May shower 5 days after surgery if no drainage from incision.  Suture removal 2 weeks after surgery at Surgeon's office.

## 2020-02-25 NOTE — DISCHARGE NOTE PROVIDER - NSDCCPTREATMENT_GEN_ALL_CORE_FT
PRINCIPAL PROCEDURE  Procedure: Right total knee replacement  Findings and Treatment: severe DJD right knee

## 2020-02-25 NOTE — PROGRESS NOTE ADULT - SUBJECTIVE AND OBJECTIVE BOX
Post Op Day # 0    SUBJECTIVE    89yo Male status post right TKR .   Patient is alert and comfortable.    Pain is controlled with current pain regimen.  Denies nausea, vomiting, chest pain, shortness of breath, abdominal pain or fever.   No new complaints.    OBJECTIVE    Vital Signs Last 24 Hrs  T(C): 36.5 (25 Feb 2020 16:19), Max: 36.5 (25 Feb 2020 16:19)  T(F): 97.7 (25 Feb 2020 16:19), Max: 97.7 (25 Feb 2020 16:19)  HR: 64 (25 Feb 2020 16:19) (55 - 75)  BP: 135/61 (25 Feb 2020 16:19) (121/57 - 156/55)  BP(mean): --  RR: 16 (25 Feb 2020 16:19) (12 - 27)  SpO2: 94% (25 Feb 2020 16:19) (92% - 99%)  I&O's Summary    25 Feb 2020 07:01  -  25 Feb 2020 17:16  --------------------------------------------------------  IN: 1450 mL / OUT: 500 mL / NET: 950 mL        PHYSICAL EXAM    Right knee dressing is clean, dry and intact.   HV with good suction, minimal output  Compartments soft, non-tender bilaterally.   Sensation to light touch is grossly intact distally.   TA/GS/EHL 5/5 bilaterally  (2+) dorsalis pedis pulse. Capillary refill is less than 2 seconds.               ASSESSMENT AND PLAN  - Orthopedically stable  - DVT prophylaxis: PAS +Aspirin 81mg q 12hr  - Continue physical therapy and occupational therapy  - Weight bearing as tolerated of the right lower extremity with assistance of a walker  - Incentive spirometry encouraged  - Pain control as clinically indicated  - Disposition:  Home Thursday pending clearances by medicine and PT/OT

## 2020-02-25 NOTE — DISCHARGE NOTE PROVIDER - HOSPITAL COURSE
This patient was admitted to [facility] with a history of severe degenerative joint disease of the right knee.  Patient went to Pre-Surgical Testing at [facility] and was medically cleared to undergo elective procedure. Patient underwent right TKR by Dr. Ronald Lomeli on 2/25/20. Procedure was well tolerated.  No operative or prerna-operative complications arose during patients hospital course.  Patient received antibiotic according to SCIP guidelines for infection prevention.  Aspirin 81mg q 12h was given for DVT prophylaxis, in addition to the use of SCDs.  Anesthesia, Medical Hospitalist, Physical Therapy and Occupational Therapy were consulted. Patient is stable for discharge with a good prognosis.  Appropriate discharge instructions and medications are provided in this document.

## 2020-02-25 NOTE — BRIEF OPERATIVE NOTE - NSICDXBRIEFPOSTOP_GEN_ALL_CORE_FT
POST-OP DIAGNOSIS:  DJD (degenerative joint disease) of knee 25-Feb-2020 13:36:50 Right Skip Navarro

## 2020-02-25 NOTE — CONSULT NOTE ADULT - SUBJECTIVE AND OBJECTIVE BOX
HPI: 89M hypothryoidism, HTN, and Glaucoma and has been combatting pain right knee for several years which has progressively worsened.  Patient has tried multiple options for pain relief including OTC medication and as well as PT with minimal relief and has undergone elective replacement of right knee successfully.  He is currently resting in bed comfortable with good pain control.     REVIEW OF SYSTEMS:  CONSTITUTIONAL: No fever, weight loss, or fatigue  EYES: No eye pain, visual disturbances, or discharge  ENMT:  No difficulty hearing, tinnitus, vertigo; No sinus or throat pain  NECK: No pain or stiffness  RESPIRATORY: No cough, wheezing, chills or hemoptysis; No shortness of breath  CARDIOVASCULAR: No chest pain, palpitations, dizziness, or leg swelling  GASTROINTESTINAL: No abdominal or epigastric pain. No nausea, vomiting, or hematemesis; No diarrhea or constipation. No melena or hematochezia.  GENITOURINARY: No dysuria, frequency, hematuria, or incontinence  NEUROLOGICAL: No headaches, memory loss, loss of strength, numbness, or tremors  MUSCULOSKELETAL: No muscle or back pain      PAST MEDICAL & SURGICAL HISTORY:  [place] (hard of hearing)  H/O: glaucoma  Hypothyroid  HTN (hypertension)  History of ear surgery      SOCIAL HISTORY:  Negative for Tobacco, EtOH and Illicit Drugs    Allergies    No Known Allergies    Intolerances        MEDICATIONS  (STANDING):  acetaminophen  IVPB .. 1000 milliGRAM(s) IV Intermittent every 6 hours  ceFAZolin   IVPB 2000 milliGRAM(s) IV Intermittent every 8 hours  dorzolamide 2%/timolol 0.5% Ophthalmic Solution 1 Drop(s) Both EYES two times a day  lactated ringers. 1000 milliLiter(s) (50 mL/Hr) IV Continuous <Continuous>  levothyroxine 50 MICROGram(s) Oral daily  simvastatin 10 milliGRAM(s) Oral at bedtime    MEDICATIONS  (PRN):  HYDROmorphone  Injectable 0.5 milliGRAM(s) IV Push every 10 minutes PRN Moderate Pain (4 - 6)  ondansetron Injectable 4 milliGRAM(s) IV Push once PRN Nausea and/or Vomiting      FAMILY HISTORY:      Vital Signs Last 24 Hrs  T(C): 36.4 (25 Feb 2020 13:35), Max: 36.4 (25 Feb 2020 13:35)  T(F): 97.5 (25 Feb 2020 13:35), Max: 97.5 (25 Feb 2020 13:35)  HR: 68 (25 Feb 2020 15:05) (55 - 68)  BP: 140/63 (25 Feb 2020 15:05) (121/57 - 156/55)  BP(mean): --  RR: 25 (25 Feb 2020 15:05) (12 - 27)  SpO2: 92% (25 Feb 2020 15:05) (92% - 99%)    PHYSICAL EXAM:    GENERAL: NAD, well-developed  HEAD:  Atraumatic, Normocephalic  EYES: EOMI, PERRLA, conjunctiva and sclera clear  ENMT: No tonsillar erythema, exudates, or enlargement; Moist mucous membranes, Good dentition, No lesions  NECK: Supple, No JVD, Normal thyroid  NERVOUS SYSTEM:  Alert & Oriented X3, Good concentration;  CHEST/LUNG: Clear to auscultation bilaterally; No rales, rhonchi, wheezing, or rubs  HEART: Regular rate and rhythm; No murmurs, rubs, or gallops  ABDOMEN: Soft, Nontender, Nondistended; Bowel sounds present  EXTREMITIES:  2+ Peripheral Pulses, No clubbing, cyanosis, or edema      LABS:              CAPILLARY BLOOD GLUCOSE          RADIOLOGY & ADDITIONAL STUDIES:    EKG:   Personally Reviewed:  [ ] YES     Imaging:   Personally Reviewed:  [ ] YES     Consultant(s) Notes Reviewed:      Care Discussed with Consultants/Other Providers:

## 2020-02-25 NOTE — DISCHARGE NOTE PROVIDER - NSDCFUSCHEDAPPT_GEN_ALL_CORE_FT
DRAKE ELISE ; 03/11/2020 ; NPP [facility] [address] [facility]  DRAKE ELISE ; 04/15/2020 ; NPP [facility] [address] [facility] [address] DRAKE ELISE ; 03/11/2020 ; NPP OrthoSurg [address] [facility]  DRAKE ELISE ; 04/15/2020 ; NPP [facility] [address] [facility] [address] DRAKE ELISE ; 03/11/2020 ; NPP OrthoSurg [address] [facility]  DRAKE ELISE ; 04/15/2020 ; NPP [facility] [address] [facility] [address]

## 2020-02-25 NOTE — PHYSICAL THERAPY INITIAL EVALUATION ADULT - ADDITIONAL COMMENTS
has no stairs in apt in [facility]. will stay at [facility] house without stairs

## 2020-02-25 NOTE — CONSULT NOTE ADULT - ASSESSMENT
89M hypothryoidism, HTN, and Glaucoma admitted for aftercare following Right TKR    S/P  Right TKR POD 0  Continue Bowel and pain control regimen.   Incentive Spirometer for lung expansion.  Work with PT to increase ambulation as per orthopedics.  Monitor Hgb and follow up electrolytes.   Orhtopedics on board and following     HTN  Controlled. Will resume home medications     Hypothyroidism  Synthroid Daily    HLD  Resume home dose of Statin    Diet  Regular    DVT Prophylaxis  Aspirin BID    Disposition  Full Code/Inpatient  Discharge planning pending hospital course

## 2020-02-25 NOTE — DISCHARGE NOTE PROVIDER - NSDCMRMEDTOKEN_GEN_ALL_CORE_FT
amLODIPine 5 mg oral tablet: 1 tab(s) orally once a day  Aspir 81 oral delayed release tablet: 1 tab(s) orally once a day  dorzolamide-timolol 2.23%-0.68% ophthalmic solution: 1 drop(s) to each affected eye 2 times a day  levothyroxine 50 mcg (0.05 mg) oral tablet: 1 tab(s) orally once a day  simvastatin 10 mg oral tablet: 1 tab(s) orally once a day (at bedtime) 3:1 Commode: Dx: s/p Right TKR  acetaminophen 500 mg oral tablet: 2 tab(s) orally every 12 hours  amLODIPine 5 mg oral tablet: 1 tab(s) orally once a day  aspirin 81 mg oral delayed release tablet: 1 tab orally 2 times a day  Take 2 hours before Celebrex  celecoxib 100 mg oral capsule: 1 cap orally every 12 hours. Take 2 hours after Aspirin.  dorzolamide-timolol 2.23%-0.68% ophthalmic solution: 1 drop(s) to each affected eye 2 times a day  levothyroxine 50 mcg (0.05 mg) oral tablet: 1 tab(s) orally once a day  pantoprazole 40 mg oral delayed release tablet: 1 tab orally once a day (before a meal)  polyethylene glycol 3350 oral powder for reconstitution: 17 gram(s) orally once a day (at bedtime)  Rolling Walker with 5 inch wheels: Dx: s/p Right TKR  senna oral tablet: 2 tab(s) orally once a day (at bedtime)  simvastatin 10 mg oral tablet: 1 tab(s) orally once a day (at bedtime)  traMADol 50 mg oral tablet: 1 tab orally every 4 hours, As Needed for pain MDD:6   Ref #: [id number] 3:1 Commode: Dx: s/p Right TKR  acetaminophen 500 mg oral tablet: 2 tab(s) orally every 12 hours  amLODIPine 5 mg oral tablet: 1 tab(s) orally once a day  aspirin 81 mg oral delayed release tablet: 1 tab orally 2 times a day  Take 2 hours before Celebrex  celecoxib 100 mg oral capsule: 1 cap orally every 12 hours. Take 2 hours after Aspirin.  dorzolamide-timolol 2.23%-0.68% ophthalmic solution: 1 drop(s) to each affected eye 2 times a day  levothyroxine 50 mcg (0.05 mg) oral tablet: 1 tab(s) orally once a day  pantoprazole 40 mg oral delayed release tablet: 1 tab orally once a day (before a meal)  polyethylene glycol 3350 oral powder for reconstitution: 17 gram(s) orally once a day (at bedtime)  Rolling Walker with 5 inch wheels: Dx: s/p Right TKR  senna oral tablet: 2 tab(s) orally once a day (at bedtime)  simvastatin 10 mg oral tablet: 1 tab(s) orally once a day (at bedtime)  traMADol 50 mg oral tablet: 1 tab orally every 4 hours, As Needed for pain MDD:6   Ref #: [id number]

## 2020-02-26 ENCOUNTER — TRANSCRIPTION ENCOUNTER (OUTPATIENT)
Age: 85
End: 2020-02-26

## 2020-02-26 LAB
ANION GAP SERPL CALC-SCNC: 8 MMOL/L — SIGNIFICANT CHANGE UP (ref 5–17)
BUN SERPL-MCNC: 23 MG/DL — SIGNIFICANT CHANGE UP (ref 7–23)
CALCIUM SERPL-MCNC: 8.1 MG/DL — LOW (ref 8.4–10.5)
CHLORIDE SERPL-SCNC: 109 MMOL/L — HIGH (ref 96–108)
CO2 SERPL-SCNC: 26 MMOL/L — SIGNIFICANT CHANGE UP (ref 22–31)
CREAT SERPL-MCNC: 1.29 MG/DL — SIGNIFICANT CHANGE UP (ref 0.5–1.3)
GLUCOSE SERPL-MCNC: 132 MG/DL — HIGH (ref 70–99)
HCT VFR BLD CALC: 37.1 % — LOW (ref 39–50)
HGB BLD-MCNC: 12.6 G/DL — LOW (ref 13–17)
MCHC RBC-ENTMCNC: 34 GM/DL — SIGNIFICANT CHANGE UP (ref 32–36)
MCHC RBC-ENTMCNC: 35.6 PG — HIGH (ref 27–34)
MCV RBC AUTO: 104.8 FL — HIGH (ref 80–100)
NRBC # BLD: 0 /100 WBCS — SIGNIFICANT CHANGE UP (ref 0–0)
PLATELET # BLD AUTO: 267 K/UL — SIGNIFICANT CHANGE UP (ref 150–400)
POTASSIUM SERPL-MCNC: 3.9 MMOL/L — SIGNIFICANT CHANGE UP (ref 3.5–5.3)
POTASSIUM SERPL-SCNC: 3.9 MMOL/L — SIGNIFICANT CHANGE UP (ref 3.5–5.3)
RBC # BLD: 3.54 M/UL — LOW (ref 4.2–5.8)
RBC # FLD: 13.1 % — SIGNIFICANT CHANGE UP (ref 10.3–14.5)
SODIUM SERPL-SCNC: 143 MMOL/L — SIGNIFICANT CHANGE UP (ref 135–145)
WBC # BLD: 15.27 K/UL — HIGH (ref 3.8–10.5)
WBC # FLD AUTO: 15.27 K/UL — HIGH (ref 3.8–10.5)

## 2020-02-26 PROCEDURE — 99232 SBSQ HOSP IP/OBS MODERATE 35: CPT

## 2020-02-26 RX ORDER — SENNA PLUS 8.6 MG/1
2 TABLET ORAL
Qty: 0 | Refills: 0 | DISCHARGE
Start: 2020-02-26

## 2020-02-26 RX ORDER — ACETAMINOPHEN 500 MG
2 TABLET ORAL
Qty: 0 | Refills: 0 | DISCHARGE
Start: 2020-02-26 | End: 2020-03-10

## 2020-02-26 RX ORDER — CELECOXIB 200 MG/1
1 CAPSULE ORAL
Qty: 60 | Refills: 0
Start: 2020-02-26 | End: 2020-03-26

## 2020-02-26 RX ORDER — ASPIRIN/CALCIUM CARB/MAGNESIUM 324 MG
1 TABLET ORAL
Qty: 82 | Refills: 0
Start: 2020-02-26 | End: 2020-04-06

## 2020-02-26 RX ORDER — ASPIRIN/CALCIUM CARB/MAGNESIUM 324 MG
1 TABLET ORAL
Qty: 0 | Refills: 0 | DISCHARGE
Start: 2020-02-26 | End: 2020-04-06

## 2020-02-26 RX ORDER — CELECOXIB 200 MG/1
100 CAPSULE ORAL EVERY 12 HOURS
Refills: 0 | Status: DISCONTINUED | OUTPATIENT
Start: 2020-02-26 | End: 2020-02-27

## 2020-02-26 RX ORDER — PANTOPRAZOLE SODIUM 20 MG/1
1 TABLET, DELAYED RELEASE ORAL
Qty: 30 | Refills: 1
Start: 2020-02-26 | End: 2020-04-25

## 2020-02-26 RX ORDER — TRAMADOL HYDROCHLORIDE 50 MG/1
50 TABLET ORAL EVERY 4 HOURS
Refills: 0 | Status: DISCONTINUED | OUTPATIENT
Start: 2020-02-26 | End: 2020-02-27

## 2020-02-26 RX ORDER — POLYETHYLENE GLYCOL 3350 17 G/17G
17 POWDER, FOR SOLUTION ORAL
Qty: 0 | Refills: 0 | DISCHARGE
Start: 2020-02-26

## 2020-02-26 RX ORDER — TRAMADOL HYDROCHLORIDE 50 MG/1
1 TABLET ORAL
Qty: 42 | Refills: 0
Start: 2020-02-26 | End: 2020-03-03

## 2020-02-26 RX ORDER — TRAMADOL HYDROCHLORIDE 50 MG/1
25 TABLET ORAL EVERY 4 HOURS
Refills: 0 | Status: DISCONTINUED | OUTPATIENT
Start: 2020-02-26 | End: 2020-02-27

## 2020-02-26 RX ADMIN — Medication 400 MILLIGRAM(S): at 05:45

## 2020-02-26 RX ADMIN — SENNA PLUS 2 TABLET(S): 8.6 TABLET ORAL at 21:51

## 2020-02-26 RX ADMIN — Medication 1000 MILLIGRAM(S): at 20:51

## 2020-02-26 RX ADMIN — TRAMADOL HYDROCHLORIDE 25 MILLIGRAM(S): 50 TABLET ORAL at 14:56

## 2020-02-26 RX ADMIN — Medication 100 MILLIGRAM(S): at 05:44

## 2020-02-26 RX ADMIN — PANTOPRAZOLE SODIUM 40 MILLIGRAM(S): 20 TABLET, DELAYED RELEASE ORAL at 05:44

## 2020-02-26 RX ADMIN — CELECOXIB 200 MILLIGRAM(S): 200 CAPSULE ORAL at 09:21

## 2020-02-26 RX ADMIN — DORZOLAMIDE HYDROCHLORIDE TIMOLOL MALEATE 1 DROP(S): 20; 5 SOLUTION/ DROPS OPHTHALMIC at 05:44

## 2020-02-26 RX ADMIN — TRAMADOL HYDROCHLORIDE 25 MILLIGRAM(S): 50 TABLET ORAL at 21:50

## 2020-02-26 RX ADMIN — CELECOXIB 100 MILLIGRAM(S): 200 CAPSULE ORAL at 21:51

## 2020-02-26 RX ADMIN — Medication 1000 MILLIGRAM(S): at 20:50

## 2020-02-26 RX ADMIN — Medication 81 MILLIGRAM(S): at 05:44

## 2020-02-26 RX ADMIN — DORZOLAMIDE HYDROCHLORIDE TIMOLOL MALEATE 1 DROP(S): 20; 5 SOLUTION/ DROPS OPHTHALMIC at 18:27

## 2020-02-26 RX ADMIN — Medication 1000 MILLIGRAM(S): at 12:51

## 2020-02-26 RX ADMIN — CELECOXIB 200 MILLIGRAM(S): 200 CAPSULE ORAL at 09:51

## 2020-02-26 RX ADMIN — Medication 1000 MILLIGRAM(S): at 13:21

## 2020-02-26 RX ADMIN — TRAMADOL HYDROCHLORIDE 25 MILLIGRAM(S): 50 TABLET ORAL at 21:20

## 2020-02-26 RX ADMIN — TRAMADOL HYDROCHLORIDE 25 MILLIGRAM(S): 50 TABLET ORAL at 15:26

## 2020-02-26 RX ADMIN — Medication 1000 MILLIGRAM(S): at 05:45

## 2020-02-26 RX ADMIN — Medication 81 MILLIGRAM(S): at 18:26

## 2020-02-26 RX ADMIN — SIMVASTATIN 10 MILLIGRAM(S): 20 TABLET, FILM COATED ORAL at 21:51

## 2020-02-26 RX ADMIN — Medication 50 MICROGRAM(S): at 05:44

## 2020-02-26 NOTE — DISCHARGE NOTE NURSING/CASE MANAGEMENT/SOCIAL WORK - NSSCNAMETXT_GEN_ALL_CORE
[facility] ([facility])   [address] [place] [address]

## 2020-02-26 NOTE — PROGRESS NOTE ADULT - SUBJECTIVE AND OBJECTIVE BOX
Subjective: No complaints.     MEDICATIONS  (STANDING):  acetaminophen   Tablet .. 1000 milliGRAM(s) Oral every 8 hours  aspirin enteric coated 81 milliGRAM(s) Oral two times a day  celecoxib 100 milliGRAM(s) Oral every 12 hours  dorzolamide 2%/timolol 0.5% Ophthalmic Solution 1 Drop(s) Both EYES two times a day  lactated ringers. 1000 milliLiter(s) (100 mL/Hr) IV Continuous <Continuous>  levothyroxine 50 MICROGram(s) Oral daily  pantoprazole    Tablet 40 milliGRAM(s) Oral before breakfast  polyethylene glycol 3350 17 Gram(s) Oral at bedtime  senna 2 Tablet(s) Oral at bedtime  simvastatin 10 milliGRAM(s) Oral at bedtime    MEDICATIONS  (PRN):  aluminum hydroxide/magnesium hydroxide/simethicone Suspension 30 milliLiter(s) Oral four times a day PRN Indigestion  HYDROmorphone  Injectable 0.5 milliGRAM(s) IV Push every 3 hours PRN Severe Pain (7 - 10)  magnesium hydroxide Suspension 30 milliLiter(s) Oral daily PRN Constipation  ondansetron Injectable 4 milliGRAM(s) IV Push every 6 hours PRN Nausea and/or Vomiting  traMADol 25 milliGRAM(s) Oral every 4 hours PRN Mild Pain (1 - 3)  traMADol 50 milliGRAM(s) Oral every 4 hours PRN Moderate Pain (4 - 6)      Allergies    No Known Allergies    Intolerances        Vital Signs Last 24 Hrs  T(C): 36.3 (26 Feb 2020 11:29), Max: 37 (25 Feb 2020 23:12)  T(F): 97.4 (26 Feb 2020 11:29), Max: 98.6 (25 Feb 2020 23:12)  HR: 61 (26 Feb 2020 11:29) (55 - 80)  BP: 148/72 (26 Feb 2020 11:29) (125/59 - 152/66)  BP(mean): --  RR: 16 (26 Feb 2020 11:29) (14 - 25)  SpO2: 95% (26 Feb 2020 11:29) (92% - 96%)    PHYSICAL EXAM:  GENERAL: NAD, well-groomed, well-developed  HEAD:  Atraumatic, Normocephalic  ENMT: Moist mucous membranes,   NECK: Supple, No JVD, Normal thyroid  NERVOUS SYSTEM:  All 4 extremities mobile, no gross sensory deficits.   CHEST/LUNG: Clear to auscultation bilaterally; No rales, rhonchi, wheezing, or rubs  HEART: Regular rate and rhythm; No murmurs, rubs, or gallops  ABDOMEN: Soft, Nontender, Nondistended; Bowel sounds present  EXTREMITIES:  2+ Peripheral Pulses, No clubbing, cyanosis, or edema      LABS:                        12.6   15.27 )-----------( 267      ( 26 Feb 2020 07:27 )             37.1     26 Feb 2020 07:27    143    |  109    |  23     ----------------------------<  132    3.9     |  26     |  1.29     Ca    8.1        26 Feb 2020 07:27          CAPILLARY BLOOD GLUCOSE          RADIOLOGY & ADDITIONAL TESTS:    Imaging Personally Reviewed:  [ ] YES     Consultant(s) Notes Reviewed:      Care Discussed with Consultants/Other Providers:    Advanced Directives: [ ] DNR  [ ] No feeding tube  [ ] MOLST in chart  [ ] MOLST completed today  [ ] Unknown

## 2020-02-26 NOTE — OCCUPATIONAL THERAPY INITIAL EVALUATION ADULT - IADL RETRAINING, OT EVAL
Patient will increase standing tolerance to 3-5 minutes with RW for grooming at the sink within 3-5 sessions

## 2020-02-26 NOTE — PROGRESS NOTE ADULT - ASSESSMENT
89M hypothryoidism, HTN, and Glaucoma admitted for aftercare following Right TKR    S/P  Right TKR POD 1  Continue Bowel and pain control regimen.   Incentive Spirometer for lung expansion.  Work with PT to increase ambulation as per orthopedics.  Monitor Hgb and follow up electrolytes.   Orhtopedics on board and following     Leukocytosis  Most likely reactive; Remains afebrile and no signs or symptoms of Infection  Monitor Off Abx and will trend CBC     HTN  Controlled. Will resume home medications     Hypothyroidism  Synthroid Daily    HLD  Resume home dose of Statin    Diet  Regular    DVT Prophylaxis  Aspirin BID    Disposition  Full Code/Inpatient  Discharge planning pending hospital course

## 2020-02-26 NOTE — PROGRESS NOTE ADULT - SUBJECTIVE AND OBJECTIVE BOX
Discharge medication calendar:  (ASA 81mg Qday preop)  Aspirin EC 81mg q12h x 6 weeks then resume ASA 81mg Qday  APAP 1000mg q12h x 2-3 weeks  Celecoxib 100mg q12h x 2-3 weeks  Pantoprazole 40mg QAM x 6 weeks  Narcotic PRN  Docusate 100mg TID while taking narcotic  Miralax, Senna, or Bisacodyl PRN for treatment of constipation

## 2020-02-26 NOTE — DISCHARGE NOTE NURSING/CASE MANAGEMENT/SOCIAL WORK - PATIENT PORTAL LINK FT
You can access the FollowMyHealth Patient Portal offered by [facility] by registering at the following website: [email or web address]. By joining [facility]’s FollowMyHealth portal, you will also be able to view your health information using other applications (apps) compatible with our system.

## 2020-02-26 NOTE — PROGRESS NOTE ADULT - SUBJECTIVE AND OBJECTIVE BOX
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   -   Distal Neurvascular status intact grossly.   -   Warm well perfused; capillary refill <3 seconds   -   (+)EHL/FHL   -   (+) Sensation to light touch  -   (-) Calf tenderness Bilaterally    A/P: 89y Male s/p Knee replacement: Right     -   Ortho Stable  -   Pain control   -   Medicine to follow  -   DVT ppx:     [ ]SCDs     [x ] ASA     [ ] Eliquis     [ ] Lovenox  -   Weight bearing status:  WBAT [ x]        PWB    [ ]     TTWB  [ ]      NWB  [ ]   -  Dispo:     Home [x ]     Acute Rehab [ ]     NICOLE [ ]     TBD [ ]

## 2020-02-26 NOTE — OCCUPATIONAL THERAPY INITIAL EVALUATION ADULT - ADDITIONAL COMMENTS
Pt reported he lives in an apartment in [place] with his wife. He plans to stay with his dtr in her apartment locally; (+) elevator access. Bathroom has a tub with TTB. Pt reported PTA he was independent with ADLs and functional mobility without AD. (-) ; pt was a community ambulator.

## 2020-02-27 VITALS
HEART RATE: 59 BPM | RESPIRATION RATE: 16 BRPM | TEMPERATURE: 98 F | DIASTOLIC BLOOD PRESSURE: 74 MMHG | SYSTOLIC BLOOD PRESSURE: 122 MMHG | OXYGEN SATURATION: 96 %

## 2020-02-27 LAB
ANION GAP SERPL CALC-SCNC: 7 MMOL/L — SIGNIFICANT CHANGE UP (ref 5–17)
BUN SERPL-MCNC: 26 MG/DL — HIGH (ref 7–23)
CALCIUM SERPL-MCNC: 8.1 MG/DL — LOW (ref 8.4–10.5)
CHLORIDE SERPL-SCNC: 111 MMOL/L — HIGH (ref 96–108)
CO2 SERPL-SCNC: 26 MMOL/L — SIGNIFICANT CHANGE UP (ref 22–31)
CREAT SERPL-MCNC: 1.24 MG/DL — SIGNIFICANT CHANGE UP (ref 0.5–1.3)
GLUCOSE SERPL-MCNC: 102 MG/DL — HIGH (ref 70–99)
HCT VFR BLD CALC: 36.9 % — LOW (ref 39–50)
HGB BLD-MCNC: 12.5 G/DL — LOW (ref 13–17)
MCHC RBC-ENTMCNC: 33.9 GM/DL — SIGNIFICANT CHANGE UP (ref 32–36)
MCHC RBC-ENTMCNC: 35.5 PG — HIGH (ref 27–34)
MCV RBC AUTO: 104.8 FL — HIGH (ref 80–100)
NRBC # BLD: 0 /100 WBCS — SIGNIFICANT CHANGE UP (ref 0–0)
PLATELET # BLD AUTO: 246 K/UL — SIGNIFICANT CHANGE UP (ref 150–400)
POTASSIUM SERPL-MCNC: 4.3 MMOL/L — SIGNIFICANT CHANGE UP (ref 3.5–5.3)
POTASSIUM SERPL-SCNC: 4.3 MMOL/L — SIGNIFICANT CHANGE UP (ref 3.5–5.3)
RBC # BLD: 3.52 M/UL — LOW (ref 4.2–5.8)
RBC # FLD: 13.6 % — SIGNIFICANT CHANGE UP (ref 10.3–14.5)
SODIUM SERPL-SCNC: 144 MMOL/L — SIGNIFICANT CHANGE UP (ref 135–145)
WBC # BLD: 11.82 K/UL — HIGH (ref 3.8–10.5)
WBC # FLD AUTO: 11.82 K/UL — HIGH (ref 3.8–10.5)

## 2020-02-27 PROCEDURE — 97530 THERAPEUTIC ACTIVITIES: CPT

## 2020-02-27 PROCEDURE — 36415 COLL VENOUS BLD VENIPUNCTURE: CPT

## 2020-02-27 PROCEDURE — 97110 THERAPEUTIC EXERCISES: CPT

## 2020-02-27 PROCEDURE — 97161 PT EVAL LOW COMPLEX 20 MIN: CPT

## 2020-02-27 PROCEDURE — C1713: CPT

## 2020-02-27 PROCEDURE — 88311 DECALCIFY TISSUE: CPT

## 2020-02-27 PROCEDURE — 97165 OT EVAL LOW COMPLEX 30 MIN: CPT

## 2020-02-27 PROCEDURE — C1776: CPT

## 2020-02-27 PROCEDURE — 85027 COMPLETE CBC AUTOMATED: CPT

## 2020-02-27 PROCEDURE — 73562 X-RAY EXAM OF KNEE 3: CPT

## 2020-02-27 PROCEDURE — 88305 TISSUE EXAM BY PATHOLOGIST: CPT

## 2020-02-27 PROCEDURE — 97535 SELF CARE MNGMENT TRAINING: CPT

## 2020-02-27 PROCEDURE — 97116 GAIT TRAINING THERAPY: CPT

## 2020-02-27 PROCEDURE — 80048 BASIC METABOLIC PNL TOTAL CA: CPT

## 2020-02-27 PROCEDURE — 99232 SBSQ HOSP IP/OBS MODERATE 35: CPT

## 2020-02-27 RX ORDER — ASPIRIN/CALCIUM CARB/MAGNESIUM 324 MG
1 TABLET ORAL
Qty: 0 | Refills: 0 | DISCHARGE

## 2020-02-27 RX ADMIN — Medication 1000 MILLIGRAM(S): at 12:28

## 2020-02-27 RX ADMIN — CELECOXIB 100 MILLIGRAM(S): 200 CAPSULE ORAL at 09:36

## 2020-02-27 RX ADMIN — PANTOPRAZOLE SODIUM 40 MILLIGRAM(S): 20 TABLET, DELAYED RELEASE ORAL at 05:41

## 2020-02-27 RX ADMIN — Medication 1000 MILLIGRAM(S): at 12:58

## 2020-02-27 RX ADMIN — Medication 50 MICROGRAM(S): at 05:41

## 2020-02-27 RX ADMIN — CELECOXIB 100 MILLIGRAM(S): 200 CAPSULE ORAL at 09:06

## 2020-02-27 RX ADMIN — DORZOLAMIDE HYDROCHLORIDE TIMOLOL MALEATE 1 DROP(S): 20; 5 SOLUTION/ DROPS OPHTHALMIC at 05:42

## 2020-02-27 RX ADMIN — Medication 81 MILLIGRAM(S): at 05:41

## 2020-02-27 RX ADMIN — Medication 1000 MILLIGRAM(S): at 04:42

## 2020-02-27 RX ADMIN — AMLODIPINE BESYLATE 5 MILLIGRAM(S): 2.5 TABLET ORAL at 05:41

## 2020-02-27 RX ADMIN — Medication 1000 MILLIGRAM(S): at 04:41

## 2020-02-27 NOTE — PROGRESS NOTE ADULT - ASSESSMENT
89M hypothryoidism, HTN, and Glaucoma admitted for aftercare following Right TKR    S/P  Right TKR POD 2  Continue Bowel and pain control regimen.   Incentive Spirometer for lung expansion.  Work with PT to increase ambulation as per orthopedics.  Monitor Hgb and follow up electrolytes.   Orhtopedics on board and following     HTN  Controlled. Will resume home medications     Hypothyroidism  Synthroid Daily    HLD  Resume home dose of Statin    Diet  Regular    DVT Prophylaxis  Aspirin BID    Disposition  Full Code/Inpatient  Discharge planning pending hospital course

## 2020-02-27 NOTE — PROGRESS NOTE ADULT - SUBJECTIVE AND OBJECTIVE BOX
POST OPERATIVE DAY #:  [ 2]   STATUS POST: [ ] Left [x ] Right  [x ]TKR [ ]THR                        Patient is a 89y old  Male who presents with a chief complaint of Right TKR for severe right knee OA (25 Feb 2020 13:53)      Pain well controlled    OBJECTIVE:     Vital Signs Last 24 Hrs  T(C): 36.7 (27 Feb 2020 07:58), Max: 36.7 (27 Feb 2020 07:58)  T(F): 98 (27 Feb 2020 07:58), Max: 98 (27 Feb 2020 07:58)  HR: 53 (27 Feb 2020 07:58) (53 - 73)  BP: 145/62 (27 Feb 2020 07:58) (122/76 - 156/71)  BP(mean): --  RR: 16 (27 Feb 2020 07:58) (16 - 17)  SpO2: 93% (27 Feb 2020 07:58) (91% - 95%)    Affected extremity:          wound  clean/dry/intact  w sutures, drain d/c'd           Sensation; intact to light touch           Motor exam: Toes warm and mobile well perfused        No calf tenderness bilateral LE's    LABS:                        12.5   11.82 )-----------( 246      ( 27 Feb 2020 07:00 )             36.9     02-27    144  |  111<H>  |  26<H>  ----------------------------<  102<H>  4.3   |  26  |  1.24    Ca    8.1<L>      27 Feb 2020 07:00              A/P :    -    Analgesics PRN  -    DVT ppx: [ x]ASA 81 bid [ ] Lovenox [ ] Coumadin   [ ] Eliquis   -    Weight bearing status: WBAT [x ]        PWB    [ ]     TTWB  [ ]      NWB  [ ]  -    Physical Therapy  -    Occupational Therapy  -    Dispo: Home [x ]     Rehab [ ]      NICOLE [ ]      To be determined [ ]

## 2020-02-27 NOTE — PROGRESS NOTE ADULT - SUBJECTIVE AND OBJECTIVE BOX
Subjective: Doing well with no overnight events.     MEDICATIONS  (STANDING):  acetaminophen   Tablet .. 1000 milliGRAM(s) Oral every 8 hours  amLODIPine   Tablet 5 milliGRAM(s) Oral daily  aspirin enteric coated 81 milliGRAM(s) Oral two times a day  celecoxib 100 milliGRAM(s) Oral every 12 hours  dorzolamide 2%/timolol 0.5% Ophthalmic Solution 1 Drop(s) Both EYES two times a day  lactated ringers. 1000 milliLiter(s) (100 mL/Hr) IV Continuous <Continuous>  levothyroxine 50 MICROGram(s) Oral daily  pantoprazole    Tablet 40 milliGRAM(s) Oral before breakfast  polyethylene glycol 3350 17 Gram(s) Oral at bedtime  senna 2 Tablet(s) Oral at bedtime  simvastatin 10 milliGRAM(s) Oral at bedtime    MEDICATIONS  (PRN):  aluminum hydroxide/magnesium hydroxide/simethicone Suspension 30 milliLiter(s) Oral four times a day PRN Indigestion  bisacodyl Suppository 10 milliGRAM(s) Rectal daily PRN If no bowel movement by postoperative day #2  HYDROmorphone  Injectable 0.5 milliGRAM(s) IV Push every 3 hours PRN Severe Pain (7 - 10)  magnesium hydroxide Suspension 30 milliLiter(s) Oral daily PRN Constipation  ondansetron Injectable 4 milliGRAM(s) IV Push every 6 hours PRN Nausea and/or Vomiting  traMADol 25 milliGRAM(s) Oral every 4 hours PRN Mild Pain (1 - 3)  traMADol 50 milliGRAM(s) Oral every 4 hours PRN Moderate Pain (4 - 6)      Allergies    No Known Allergies    Intolerances        Vital Signs Last 24 Hrs  T(C): 36.9 (27 Feb 2020 10:28), Max: 36.9 (27 Feb 2020 10:28)  T(F): 98.5 (27 Feb 2020 10:28), Max: 98.5 (27 Feb 2020 10:28)  HR: 59 (27 Feb 2020 10:28) (53 - 73)  BP: 122/74 (27 Feb 2020 10:28) (122/74 - 156/71)  BP(mean): --  RR: 16 (27 Feb 2020 10:28) (16 - 17)  SpO2: 96% (27 Feb 2020 10:28) (91% - 96%)    PHYSICAL EXAM:  GENERAL: NAD, well-groomed, well-developed  HEAD:  Atraumatic, Normocephalic  ENMT: Moist mucous membranes,   NECK: Supple, No JVD, Normal thyroid  NERVOUS SYSTEM:  All 4 extremities mobile, no gross sensory deficits.   CHEST/LUNG: Clear to auscultation bilaterally; No rales, rhonchi, wheezing, or rubs  HEART: Regular rate and rhythm; No murmurs, rubs, or gallops  ABDOMEN: Soft, Nontender, Nondistended; Bowel sounds present  EXTREMITIES:  2+ Peripheral Pulses, No clubbing, cyanosis, or edema      LABS:                        12.5   11.82 )-----------( 246      ( 27 Feb 2020 07:00 )             36.9     27 Feb 2020 07:00    144    |  111    |  26     ----------------------------<  102    4.3     |  26     |  1.24     Ca    8.1        27 Feb 2020 07:00          CAPILLARY BLOOD GLUCOSE          RADIOLOGY & ADDITIONAL TESTS:    Imaging Personally Reviewed:  [ ] YES     Consultant(s) Notes Reviewed:      Care Discussed with Consultants/Other Providers:    Advanced Directives: [ ] DNR  [ ] No feeding tube  [ ] MOLST in chart  [ ] MOLST completed today  [ ] Unknown

## 2020-02-27 NOTE — PROGRESS NOTE ADULT - SUBJECTIVE AND OBJECTIVE BOX
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   -   Distal Neurvascular status intact grossly.   -   Warm well perfused; capillary refill <3 seconds   -   (+)EHL/FHL   -   (+) Sensation to light touch  -   (-) Calf tenderness Bilaterally    A/P: 89y Male s/p Knee replacement: Right     -   Ortho Stable  -   Pain control   -   Medicine to follow  -   DVT ppx:     [ ]SCDs     [x ] ASA     [ ] Eliquis     [ ] Lovenox  -   Weight bearing status:  WBAT [x ]        PWB    [ ]     TTWB  [ ]      NWB  [ ]   -  Dispo:     Home [x ]     Acute Rehab [ ]     NICOLE [ ]     TBD [ ]

## 2020-03-03 ENCOUNTER — RX RENEWAL (OUTPATIENT)
Age: 85
End: 2020-03-03

## 2020-03-05 ENCOUNTER — APPOINTMENT (OUTPATIENT)
Dept: ORTHOPEDIC SURGERY | Facility: CLINIC | Age: 85
End: 2020-03-05
Payer: MEDICARE

## 2020-03-05 PROCEDURE — 99024 POSTOP FOLLOW-UP VISIT: CPT

## 2020-03-05 RX ORDER — DORZOLAMIDE HYDROCHLORIDE TIMOLOL MALEATE 20; 5 MG/ML; MG/ML
22.3-6.8 SOLUTION/ DROPS OPHTHALMIC
Qty: 10 | Refills: 0 | Status: ACTIVE | COMMUNITY
Start: 2020-02-03

## 2020-03-06 NOTE — END OF VISIT
[FreeTextEntry3] : I, Ronnie Kang MD, ordering physician, have read and attest that all the information, medical decision making and discharge instructions within are true and accurate.

## 2020-03-06 NOTE — ADDENDUM
[FreeTextEntry1] : I, Yina Wallis wrote this note acting as a scribe for Dr. Ronnie Kang on Mar 05, 2020.

## 2020-03-06 NOTE — HISTORY OF PRESENT ILLNESS
[de-identified] : s/p Right TKR with synovectomy on 02/25/2020 with Dr. Lomeli. The patient will be evaluated in the absence of Dr. Lomeli.  [de-identified] : Pt is a 89 y/o male 9 days s/p R TKR.  He presents c/o rash that developed in the posterior portion of his knee. It is not pruritic. Denies any other symptoms. There has been no development of fever, chills, N/V/D.  [de-identified] : Patient is WDWN, alert, and in no acute distress. Breathing is unlabored. He is grossly oriented to person, place, and time. \par \par Right Knee: Popliteal and proximal calf papulomacular diffuse rash. There is a healing midline incision extending over the center of the patella. Nylon sutures in place. No signs of infection. Aquacel dressing in place over the distal lateral femur where the drain was removed from. \par Range of Motion: Active flexion to 115 °.  [de-identified] : No imaging done today.  [de-identified] : Patient was advised Benadryl PO for the interim to manage the allergic dermatitis. He should elevate the leg above heart level if there is an increase of swelling. Patient and his daughter were advised to monitor the area for any signs of infection. Otherwise, he will followup with Dr. Lomeli next week.

## 2020-03-06 NOTE — CONSULT LETTER
[Dear  ___] : Dear  [unfilled], [I had the pleasure of evaluating your patient, [unfilled].] : I had the pleasure of evaluating your patient, [unfilled]. [Please see my note below.] : Please see my note below. [Sincerely,] : Sincerely, [FreeTextEntry2] : TAMMY SILVA  [FreeTextEntry3] : Ronnie Kang MD

## 2020-03-11 ENCOUNTER — APPOINTMENT (OUTPATIENT)
Dept: ORTHOPEDIC SURGERY | Facility: CLINIC | Age: 85
End: 2020-03-11
Payer: MEDICARE

## 2020-03-11 VITALS — BODY MASS INDEX: 23.92 KG/M2 | HEIGHT: 63 IN | WEIGHT: 135 LBS

## 2020-03-11 PROCEDURE — 99024 POSTOP FOLLOW-UP VISIT: CPT

## 2020-03-11 PROCEDURE — 73560 X-RAY EXAM OF KNEE 1 OR 2: CPT | Mod: RT

## 2020-03-11 NOTE — DISCUSSION/SUMMARY
[de-identified] : I discussed the underlying pathophysiology of the patient's condition in great detail with the patient. He is progressing well s/p right TKR. He can begin out-patient PT. A prescription for PT was provided. All questions were answered, all alternatives discussed and the patient is in complete agreement with that plan. If he is no longer itchy he can d/c Benadryl. He was given a prescription of Mobic to take prn. Once steri-strips fall off use moisturizer and lotion on incision site. All questions were answered, all alternatives discussed and the patient is in complete agreement with that plan.\par \par FU 1 month

## 2020-03-11 NOTE — ADDENDUM
OB HISTORY AND PHYSICAL EXAM    PRIMARY CERTIFIED NURSE MIDWIFE:  Bobbi Enamorado CNM ([facility])    ADMITTING ATTENDING/CERTIFIED NURSE MIDWIFE::   Fernando Obrien MD/Bobbi Enamorado CNM    Chief Complaint   Patient presents with   • Scheduled Induction       HPI:  Niya Kim is a 40 year old , JAYCOB 2019 @ 40w4d who presents for IOL.  She denies contractions, leaking of fluid, or vaginal bleeding.  She denies headache, vision changes, or RUQ pain. She has a history of 2 prior uncomplicated NSVDs in the [facility].  She denies wanting any pain medication while in labor.  Her  is present with her.        Patient Active Problem List   Diagnosis   • Language barrier, cultural differences   • AMA (advanced maternal age) multigravida 35+   • GBS bacteriuria   • Rh negative status during pregnancy        Current pregnancy: RH negative, GBS bacteruria, breech presentation resolved on  after successful version, advanced maternal age, routine prenatal care and polyhydramnios (resolved on ).   Past Medical History:   Diagnosis Date   • AMA (advanced maternal age) multigravida 35+      No past surgical history on file.  GYNECOLOGIC HISTORY:  Niya Kim has no history of STDs, Denies all sexually transmitted disease's.  Niya Kim has no history of abnormal Pap smears.   Last pap result was Normal.  Social History     Tobacco Use   • Smoking status: Never Smoker   • Smokeless tobacco: Never Used   Substance Use Topics   • Alcohol use: Never     Frequency: Never       Sexually Active: Yes             Partners with: Male      Drug Use:    Never           Review of patient's social economics indicates:  No social economics status on file    No family history on file.  Outpatient Medications Marked as Taking for the 19 encounter (Hospital Encounter)   Medication Sig Dispense Refill   • Prenatal Vit-DSS-Fe Fum-FA (SE- 19) 29-1 MG tablet Take 1 tablet by mouth daily.  3     ALLERGIES:  [FreeTextEntry1] : I, Boy Alvarado, acted solely as a scribe for Dr. Ronald Lomeli on this date 03/11/2020 .\par All medical record entries made by the Scribe were at my, Dr. Ronald Lomeli, direction and personally dictated by me on 03/11/2020 . I have reviewed the chart and agree that the record accurately reflects my personal performance of the history, physical exam, assessment and plan. I have also personally directed, reviewed, and agreed with the chart.  no known allergies.    ROS:    Pertinent items are noted in history of present illness    OBJECTIVE:  PHYSICAL EXAM  Visit Vitals  BP (!) 125/99   Pulse 88   Temp 97.9 °F (36.6 °C)   Resp 18       General:   WD, WN, , female, in NAD   Psych::  A&O x 3, appropriate mood and affect   Skin:   W/D, intact, no rashes or lesions   Lungs:   Respirations unlabored   Heart:   Regular rate and rythmn   Abdomen:  Gravid, soft, NT, no masses   Extremities No edema   Fetal Surveillance/  [place]: Fetal heart variability: moderate  Fetal Heart Rate decelerations: variable  Fetal Heart Rate accelerations: yes  Baseline FHR: 145 per minute  Uterine contractions: irregular   Uterine Size: size equals dates   SVE:     Dilation: 1cm    Effacement: 30%    Station:  -3    Consistency: medium    Position:    posterior    Presentation:   cephalic      PRENATAL LABS:    2019 Chlamydia Screen NEGATIVE   2019 Gonorrhea Screen (GC) NEGATIVE   2019 Trichomonas NEGATIVE   2019 Urine Culture/Screen POSITIVE GBS ,000CFU  2019 Antibody Screen NEGATIVE   2019 Blood Rh (D) Type NEGATIVE   2019 Blood Type A   2019 Hematocrit WNL - 33.9   2019 Hemoglobin NORMAL - 11.9   2019 Hepatitis B Surface Antigen NEGATIVE   2019 HIV Screen NEGATIVE   2019 Platelet Assessment   2019 Rubella Screen IMMUNE   2019 Varicella IMMUNE   2019 Hgb Electrophoresis AA Normal adult Hgb  2019 VDRL/RPR NON-REACTIVE   2019 Chlamydia Screen NEGATIVE   2019 Gonorrhea Screen (GC) NEGATIVE   2019 Trichomonas NEGATIVE   2019 Urine Culture/Screen NEGATIVE  2019 Hematocrit 35.5  2019 Hemoglobin 11.7  2019 Platelets 195  2019 GDM Screening 124  2019 RPR Negative  2019 Chlamydia Negative  2019 Gonorrhea Negative  2019 Trichomonas Negative    IMAGIN2019: 37w3d SIUP, JAYCOB 19, FHR 171bpm,  CEPHALIC, BPP 8/8, polyhydramnios,     19: 36w2d SIUP, JAYCOB 19, FHR 148bpm, BREECH, placenta anterior, no previa, normal AF, BPP 8/8, HC 48%, AC 67%, EFW 2926gm 54%    19: 32w2d SIUP JAYCOB 19, FHR 145bpm, BREECH, placenta anterior, no previa, normal AF, BPP 8/8, HC 61%, AC 32%, EFW 1852gm 31%, Recommend: continued twice weekly  testing. Fetal growth in 4 weeks    05/10/2019: Abdominal- [facility] - anatomy  Placenta: anterior fundal no previa grade 0  20w2d SIUP  bpm cephalic presentation normal P cord insertion normal AFV with largest pocket of 4.27cm HC 51% AC 41% EFW 50% No ultrasound evidence of fetal anomaly or aneuploidy    2019: Abdominal-[facility] - dating  12w6d SIUP  bpm Normal AFV CRL of 65.9 mm corresponding to EGA of 12w6d JAYCOB of 19 Normal NT    ASSESSMENT:   40 year old @40w4d IUP  IOL for AMA and post dates  Preeclampsia  RH negative  Hx of breech presentation with successful version on 19  Hx of polyhydramnios- resolved 19  GBS bacteruira  FHR Category II    PLAN:  Admitted as inpatient to L&D.  Discussed plan for IOL.  Landrum bulb placed at 2335 with 60cc of fluid.  Plan to start pitocin after 1st dose of PCN.    Elevated blood pressure on admission, patient is asymptomatic,  Protein/creat ratio 541.  Continue to monitor blood pressures closely.   PCN per protocol  LR @ 150cc/hr  Continuous fetal monitoring  Clear liquid diet  Type & Screen and CBC ordered  Expectant management    Dr. Obrien aware of admission and patient's status.    Bobbi Enamorado CNM

## 2020-03-11 NOTE — PHYSICAL EXAM
[de-identified] : Constitutional\par o Appearance : well-nourished, well developed, alert, in no acute distress \par Head and Face\par o Head :\par ¦ Inspection : atraumatic, normocephalic\par o Face :\par ¦ Inspection : no visible rash or discoloration\par Respiratory\par o Respiratory Effort: breathing unlabored \par Neurologic\par o Mental Status Examination :\par ¦ Orientation : alert and oriented X 3\par Psychiatric\par o Mood and Affect: mood normal, affect appropriate\par Cardiovascular\par o Observation/Palpation : - no swelling\par Lymphatic\par o Additional Nodes : No palpable lymph nodes present\par \par Right Lower Extremity\par o Buttock : no tenderness, swelling or deformities \par o Right Hip :\par ¦ Inspection/Palpation : no tenderness, swelling or deformities\par ¦ Range of Motion : full and painless in all planes, no crepitance\par ¦ Stability : joint stability intact\par ¦ Strength : extension, flexion, adduction, abduction, internal rotation and external rotation, 5/5\par \par o Right Knee :\par ¦ Inspection/Palpation : medial compartment tenderness to palpation, no swelling, varus deformity (worse than the left), sutures removed, Steri-Strips and benzoin applied \par ¦ Range of Motion : 0-100 degrees, no crepitance\par ¦ Stability : no valgus or varus instability present on provocative testing\par ¦ Strength : flexion and extension 4/5\par ¦ Tests and Signs : negative Anterior Drawer, negative Lachman, negative Mariajose\par \par o Peripheral Vascular System :\par ¦ Dorsalis Pedis Artery : pulse 2+ bilaterally\par ¦ Posterior Tibial Artery : pulse 2+ bilaterally \par Left Lower Extremity\par o Buttock : no tenderness, swelling or deformities \par o Left Hip :\par ¦ Inspection/Palpation : no tenderness, no swelling or deformities\par ¦ Range of Motion : full and painless in all planes, no crepitance\par ¦ Stability : joint stability intact\par ¦ Strength : extension, flexion, adduction, abduction, internal rotation and external rotation, 5/5\par \par o Left Knee :\par ¦ Inspection/Palpation : no tenderness to palpation, no swelling, varus deformity \par ¦ Range of Motion : 2-120 degrees, no crepitance\par ¦ Stability : mild valgus or varus instability present on provocative testing\par ¦ Strength : flexion and extension 5/5\par ¦ Tests and Signs : negative Anterior Drawer, negative Lachman, negative Mariajose\par \par o Peripheral Vascular System :\par ¦ Dorsalis Pedis Artery : pulse 2+ bilaterally\par ¦ Posterior Tibial Artery : pulse 2+ bilaterally \par \par Gait and Station:\par Gait: varus thrust on the left , no significant extremity swelling or lymphedema, good proprioception and balance\par \par Radiology Results \par o Right Knee : Right Knee: Standing AP and lateral views of the right knee revealed excellent position of his total knee replacement.

## 2020-03-11 NOTE — HISTORY OF PRESENT ILLNESS
[de-identified] : Patient presents for his first post-op visit. He had right TKR on 2/25/2020. He has been receiving in-home PT and is doing well. He has been walking with a walker and feels steady. He did develop a rash on his right leg, which spread into the lower leg, as well as his hands. It was believed to be from the Celebrex. He has since stopped taking the Celebrex. He is taking Benadryl 2 times a day.

## 2020-04-13 ENCOUNTER — APPOINTMENT (OUTPATIENT)
Dept: ORTHOPEDIC SURGERY | Facility: CLINIC | Age: 85
End: 2020-04-13

## 2020-04-15 ENCOUNTER — APPOINTMENT (OUTPATIENT)
Dept: HEART AND VASCULAR | Facility: CLINIC | Age: 85
End: 2020-04-15

## 2020-06-03 ENCOUNTER — RX RENEWAL (OUTPATIENT)
Age: 85
End: 2020-06-03

## 2020-06-11 ENCOUNTER — APPOINTMENT (OUTPATIENT)
Dept: ORTHOPEDIC SURGERY | Facility: CLINIC | Age: 85
End: 2020-06-11
Payer: MEDICARE

## 2020-06-11 PROCEDURE — 99214 OFFICE O/P EST MOD 30 MIN: CPT

## 2020-06-11 PROCEDURE — 73562 X-RAY EXAM OF KNEE 3: CPT | Mod: RT

## 2020-07-13 ENCOUNTER — APPOINTMENT (OUTPATIENT)
Dept: ORTHOPEDIC SURGERY | Facility: CLINIC | Age: 85
End: 2020-07-13
Payer: MEDICARE

## 2020-07-13 PROCEDURE — 99214 OFFICE O/P EST MOD 30 MIN: CPT | Mod: 25

## 2020-07-13 PROCEDURE — 20610 DRAIN/INJ JOINT/BURSA W/O US: CPT | Mod: LT

## 2020-07-13 PROCEDURE — 73562 X-RAY EXAM OF KNEE 3: CPT | Mod: LT

## 2020-08-10 ENCOUNTER — APPOINTMENT (OUTPATIENT)
Dept: ORTHOPEDIC SURGERY | Facility: CLINIC | Age: 85
End: 2020-08-10
Payer: MEDICARE

## 2020-08-10 PROCEDURE — 73562 X-RAY EXAM OF KNEE 3: CPT | Mod: RT

## 2020-08-10 PROCEDURE — 99214 OFFICE O/P EST MOD 30 MIN: CPT

## 2020-08-27 ENCOUNTER — RX RENEWAL (OUTPATIENT)
Age: 85
End: 2020-08-27

## 2020-10-07 ENCOUNTER — NON-APPOINTMENT (OUTPATIENT)
Age: 85
End: 2020-10-07

## 2020-10-07 ENCOUNTER — APPOINTMENT (OUTPATIENT)
Dept: RADIOLOGY | Facility: CLINIC | Age: 85
End: 2020-10-07

## 2020-10-07 ENCOUNTER — APPOINTMENT (OUTPATIENT)
Dept: INTERNAL MEDICINE | Facility: CLINIC | Age: 85
End: 2020-10-07
Payer: MEDICARE

## 2020-10-07 ENCOUNTER — OUTPATIENT (OUTPATIENT)
Dept: OUTPATIENT SERVICES | Facility: HOSPITAL | Age: 85
LOS: 1 days | End: 2020-10-07
Payer: MEDICARE

## 2020-10-07 VITALS
HEART RATE: 69 BPM | DIASTOLIC BLOOD PRESSURE: 78 MMHG | WEIGHT: 142 LBS | HEIGHT: 62 IN | TEMPERATURE: 98.4 F | BODY MASS INDEX: 26.13 KG/M2 | OXYGEN SATURATION: 98 % | SYSTOLIC BLOOD PRESSURE: 159 MMHG

## 2020-10-07 DIAGNOSIS — M17.12 UNILATERAL PRIMARY OSTEOARTHRITIS, LEFT KNEE: ICD-10-CM

## 2020-10-07 DIAGNOSIS — Z96.651 PRESENCE OF RIGHT ARTIFICIAL KNEE JOINT: ICD-10-CM

## 2020-10-07 DIAGNOSIS — Z92.29 PERSONAL HISTORY OF OTHER DRUG THERAPY: ICD-10-CM

## 2020-10-07 DIAGNOSIS — Z98.890 OTHER SPECIFIED POSTPROCEDURAL STATES: Chronic | ICD-10-CM

## 2020-10-07 PROCEDURE — 93000 ELECTROCARDIOGRAM COMPLETE: CPT

## 2020-10-07 PROCEDURE — 99214 OFFICE O/P EST MOD 30 MIN: CPT | Mod: 25

## 2020-10-07 PROCEDURE — 71046 X-RAY EXAM CHEST 2 VIEWS: CPT | Mod: 26

## 2020-10-07 PROCEDURE — 36415 COLL VENOUS BLD VENIPUNCTURE: CPT

## 2020-10-07 RX ORDER — MUPIROCIN 20 MG/G
2 OINTMENT TOPICAL
Qty: 22 | Refills: 0 | Status: DISCONTINUED | COMMUNITY
Start: 2020-02-12 | End: 2020-10-07

## 2020-10-07 RX ORDER — PANTOPRAZOLE 40 MG/1
40 TABLET, DELAYED RELEASE ORAL
Qty: 30 | Refills: 0 | Status: DISCONTINUED | COMMUNITY
Start: 2020-02-26 | End: 2020-10-07

## 2020-10-07 RX ORDER — MELOXICAM 15 MG/1
15 TABLET ORAL DAILY
Qty: 30 | Refills: 3 | Status: DISCONTINUED | COMMUNITY
Start: 2020-03-11 | End: 2020-10-07

## 2020-10-07 RX ORDER — ZOSTER VACCINE RECOMBINANT, ADJUVANTED 50 MCG/0.5
50 KIT INTRAMUSCULAR
Qty: 1 | Refills: 1 | Status: DISCONTINUED | COMMUNITY
Start: 2019-10-03 | End: 2020-10-07

## 2020-10-07 RX ORDER — CELECOXIB 100 MG/1
100 CAPSULE ORAL
Qty: 60 | Refills: 0 | Status: DISCONTINUED | COMMUNITY
Start: 2020-02-26 | End: 2020-10-07

## 2020-10-07 RX ORDER — TRAMADOL HYDROCHLORIDE 50 MG/1
50 TABLET, COATED ORAL
Qty: 42 | Refills: 0 | Status: DISCONTINUED | COMMUNITY
Start: 2020-02-26 | End: 2020-10-07

## 2020-10-07 RX ORDER — HYALURONATE SODIUM, STABILIZED 88 MG/4 ML
88 SYRINGE (ML) INTRAARTICULAR
Qty: 1 | Refills: 0 | Status: DISCONTINUED | COMMUNITY
Start: 2020-06-11 | End: 2020-10-07

## 2020-10-13 LAB
ALBUMIN SERPL ELPH-MCNC: 4 G/DL
ALP BLD-CCNC: 60 U/L
ALT SERPL-CCNC: 23 U/L
ANION GAP SERPL CALC-SCNC: 11 MMOL/L
APTT BLD: 28.9 SEC
AST SERPL-CCNC: 18 U/L
BASOPHILS # BLD AUTO: 0.13 K/UL
BASOPHILS NFR BLD AUTO: 1.6 %
BILIRUB SERPL-MCNC: 0.4 MG/DL
BUN SERPL-MCNC: 22 MG/DL
CALCIUM SERPL-MCNC: 9.5 MG/DL
CHLORIDE SERPL-SCNC: 107 MMOL/L
CO2 SERPL-SCNC: 23 MMOL/L
CREAT SERPL-MCNC: 1.13 MG/DL
EOSINOPHIL # BLD AUTO: 0.26 K/UL
EOSINOPHIL NFR BLD AUTO: 3.1 %
GLUCOSE SERPL-MCNC: 94 MG/DL
HCT VFR BLD CALC: 40.9 %
HGB BLD-MCNC: 14.5 G/DL
IMM GRANULOCYTES NFR BLD AUTO: 0.1 %
INR PPP: 0.95 RATIO
LYMPHOCYTES # BLD AUTO: 2.35 K/UL
LYMPHOCYTES NFR BLD AUTO: 28.2 %
MAN DIFF?: NORMAL
MCHC RBC-ENTMCNC: 35.5 GM/DL
MCHC RBC-ENTMCNC: 39.3 PG
MCV RBC AUTO: 110.8 FL
MONOCYTES # BLD AUTO: 0.78 K/UL
MONOCYTES NFR BLD AUTO: 9.4 %
NEUTROPHILS # BLD AUTO: 4.81 K/UL
NEUTROPHILS NFR BLD AUTO: 57.6 %
PLATELET # BLD AUTO: 301 K/UL
POTASSIUM SERPL-SCNC: 4.3 MMOL/L
PROT SERPL-MCNC: 7 G/DL
PT BLD: 11.2 SEC
RBC # BLD: 3.69 M/UL
RBC # FLD: 13.6 %
SARS-COV-2 IGG SERPL IA-ACNC: 0.1 INDEX
SARS-COV-2 IGG SERPL QL IA: NEGATIVE
SODIUM SERPL-SCNC: 141 MMOL/L
TSH SERPL-ACNC: 1.66 UIU/ML
WBC # FLD AUTO: 8.34 K/UL

## 2020-10-13 NOTE — HISTORY OF PRESENT ILLNESS
[No Pertinent Cardiac History] : no history of aortic stenosis, atrial fibrillation, coronary artery disease, recent myocardial infarction, or implantable device/pacemaker [No Pertinent Pulmonary History] : no history of asthma, COPD, sleep apnea, or smoking [No Adverse Anesthesia Reaction] : no adverse anesthesia reaction in self or family member [(Patient denies any chest pain, claudication, dyspnea on exertion, orthopnea, palpitations or syncope)] : Patient denies any chest pain, claudication, dyspnea on exertion, orthopnea, palpitations or syncope [FreeTextEntry1] : L TKR [FreeTextEntry2] : 10/27/20 [FreeTextEntry4] : No nausea, vomiting, diarrhea, and constipation. No chest pain or shortness of breath.\par

## 2020-10-13 NOTE — ADDENDUM
[FreeTextEntry1] : Labs WAL, CXR OLLIE, Echo Feb 2020 EF normal, no sig abn's\par ASA on hold\par Pt is medically optimized for Sx and GA.  Medically cleared for Sx.

## 2020-10-19 ENCOUNTER — OUTPATIENT (OUTPATIENT)
Dept: OUTPATIENT SERVICES | Facility: HOSPITAL | Age: 85
LOS: 1 days | End: 2020-10-19
Payer: MEDICARE

## 2020-10-19 VITALS
OXYGEN SATURATION: 99 % | SYSTOLIC BLOOD PRESSURE: 167 MMHG | TEMPERATURE: 97 F | DIASTOLIC BLOOD PRESSURE: 66 MMHG | HEART RATE: 59 BPM | RESPIRATION RATE: 21 BRPM | HEIGHT: 63 IN | WEIGHT: 141.98 LBS

## 2020-10-19 DIAGNOSIS — Z98.890 OTHER SPECIFIED POSTPROCEDURAL STATES: Chronic | ICD-10-CM

## 2020-10-19 DIAGNOSIS — Z01.818 ENCOUNTER FOR OTHER PREPROCEDURAL EXAMINATION: ICD-10-CM

## 2020-10-19 DIAGNOSIS — M17.12 UNILATERAL PRIMARY OSTEOARTHRITIS, LEFT KNEE: ICD-10-CM

## 2020-10-19 DIAGNOSIS — Z96.651 PRESENCE OF RIGHT ARTIFICIAL KNEE JOINT: Chronic | ICD-10-CM

## 2020-10-19 RX ORDER — MUPIROCIN 20 MG/G
1 OINTMENT TOPICAL
Qty: 1 | Refills: 0
Start: 2020-10-19 | End: 2020-10-23

## 2020-10-19 NOTE — H&P PST ADULT - NSICDXPROBLEM_GEN_ALL_CORE_FT
PROBLEM DIAGNOSES  Problem: Primary osteoarthritis of left knee  Assessment and Plan: LEFT Total Knee Replacement on 10/27/2020.

## 2020-10-19 NOTE — H&P PST ADULT - ASSESSMENT
89yo male patient scheduled for surgery on 10/27/2020. He will obtain Medical Clearance from his PMD. He will be NPO as per Anesthesia and will take Levothyroxine and Amlodipine on AM of surgery with a sip of water or Gatorade. He will drink 20oz of Gatorade in AM prior to coming to the hospital, as per Anesthesia protocol. He will use Mupirocin nasal ointment as directed.  All per-op instructions reviewed with patient. He denies any metal allergies.  As per Covid-19 Protocol, he will be screened for Covid on 10/25/2020, his history was obtained via telephone interview and his PE will be done on admission.

## 2020-10-19 NOTE — H&P PST ADULT - HISTORY OF PRESENT ILLNESS
89yo male patient with several year history of progressively worsening left knee pain. He has had injections "years ago" without significant improvement. He rates the pain at 8/10 and "can hardly walk." He is taking Tylenol 1000mg 2xdaily with minimal relief. He was told that TKR is recommended.

## 2020-10-19 NOTE — H&P PST ADULT - NSICDXPASTMEDICALHX_GEN_ALL_CORE_FT
PAST MEDICAL HISTORY:  H/O: glaucoma     [place] (hard of hearing)     HTN (hypertension)     Hypothyroid

## 2020-10-19 NOTE — H&P PST ADULT - ABILITY TO HEAR (WITH HEARING AID OR HEARING APPLIANCE IF NORMALLY USED):
Mildly to Moderately Impaired: difficulty hearing in some environments or speaker may need to increase volume or speak distinctly/one hearing aide

## 2020-10-19 NOTE — H&P PST ADULT - NSICDXPASTSURGICALHX_GEN_ALL_CORE_FT
PAST SURGICAL HISTORY:  History of ear surgery     S/P total knee replacement, right 2/2020- right knee

## 2020-10-19 NOTE — H&P PST ADULT - NSICDXFAMILYHX_GEN_ALL_CORE_FT
FAMILY HISTORY:  Family history of myocardial infarction at age less than 60, brother-  Family history of stroke or transient ischemic attack in daughter, daughter-

## 2020-10-21 ENCOUNTER — APPOINTMENT (OUTPATIENT)
Dept: ORTHOPEDIC SURGERY | Facility: CLINIC | Age: 85
End: 2020-10-21
Payer: MEDICARE

## 2020-10-21 DIAGNOSIS — M17.11 UNILATERAL PRIMARY OSTEOARTHRITIS, RIGHT KNEE: ICD-10-CM

## 2020-10-21 DIAGNOSIS — Z11.59 ENCOUNTER FOR SCREENING FOR OTHER VIRAL DISEASES: ICD-10-CM

## 2020-10-21 PROCEDURE — 99214 OFFICE O/P EST MOD 30 MIN: CPT

## 2020-10-21 PROCEDURE — G0463: CPT

## 2020-10-21 PROCEDURE — 73562 X-RAY EXAM OF KNEE 3: CPT | Mod: 50

## 2020-10-21 PROCEDURE — 99072 ADDL SUPL MATRL&STAF TM PHE: CPT

## 2020-10-25 ENCOUNTER — OUTPATIENT (OUTPATIENT)
Dept: OUTPATIENT SERVICES | Facility: HOSPITAL | Age: 85
LOS: 1 days | End: 2020-10-25
Payer: MEDICARE

## 2020-10-25 DIAGNOSIS — Z96.651 PRESENCE OF RIGHT ARTIFICIAL KNEE JOINT: Chronic | ICD-10-CM

## 2020-10-25 DIAGNOSIS — Z98.890 OTHER SPECIFIED POSTPROCEDURAL STATES: Chronic | ICD-10-CM

## 2020-10-25 DIAGNOSIS — Z11.59 ENCOUNTER FOR SCREENING FOR OTHER VIRAL DISEASES: ICD-10-CM

## 2020-10-25 LAB — SARS-COV-2 RNA SPEC QL NAA+PROBE: SIGNIFICANT CHANGE UP

## 2020-10-25 PROCEDURE — U0003: CPT

## 2020-10-26 ENCOUNTER — TRANSCRIPTION ENCOUNTER (OUTPATIENT)
Age: 85
End: 2020-10-26

## 2020-10-27 ENCOUNTER — TRANSCRIPTION ENCOUNTER (OUTPATIENT)
Age: 85
End: 2020-10-27

## 2020-10-27 ENCOUNTER — INPATIENT (INPATIENT)
Facility: HOSPITAL | Age: 85
LOS: 0 days | Discharge: HOME | DRG: 470 | End: 2020-10-28
Attending: SPECIALIST | Admitting: SPECIALIST
Payer: MEDICARE

## 2020-10-27 ENCOUNTER — RESULT REVIEW (OUTPATIENT)
Age: 85
End: 2020-10-27

## 2020-10-27 ENCOUNTER — APPOINTMENT (OUTPATIENT)
Dept: ORTHOPEDIC SURGERY | Facility: HOSPITAL | Age: 85
End: 2020-10-27

## 2020-10-27 VITALS
WEIGHT: 139.55 LBS | SYSTOLIC BLOOD PRESSURE: 167 MMHG | RESPIRATION RATE: 18 BRPM | HEIGHT: 61 IN | HEART RATE: 58 BPM | TEMPERATURE: 97 F | DIASTOLIC BLOOD PRESSURE: 66 MMHG | OXYGEN SATURATION: 99 %

## 2020-10-27 DIAGNOSIS — Z98.890 OTHER SPECIFIED POSTPROCEDURAL STATES: Chronic | ICD-10-CM

## 2020-10-27 DIAGNOSIS — Z96.651 PRESENCE OF RIGHT ARTIFICIAL KNEE JOINT: Chronic | ICD-10-CM

## 2020-10-27 DIAGNOSIS — E03.9 HYPOTHYROIDISM, UNSPECIFIED: ICD-10-CM

## 2020-10-27 DIAGNOSIS — M17.12 UNILATERAL PRIMARY OSTEOARTHRITIS, LEFT KNEE: ICD-10-CM

## 2020-10-27 DIAGNOSIS — Z86.69 PERSONAL HISTORY OF OTHER DISEASES OF THE NERVOUS SYSTEM AND SENSE ORGANS: ICD-10-CM

## 2020-10-27 DIAGNOSIS — I10 ESSENTIAL (PRIMARY) HYPERTENSION: ICD-10-CM

## 2020-10-27 LAB
ANION GAP SERPL CALC-SCNC: 8 MMOL/L — SIGNIFICANT CHANGE UP (ref 5–17)
BLD GP AB SCN SERPL QL: SIGNIFICANT CHANGE UP
BUN SERPL-MCNC: 18 MG/DL — SIGNIFICANT CHANGE UP (ref 7–23)
CALCIUM SERPL-MCNC: 8.3 MG/DL — LOW (ref 8.4–10.5)
CHLORIDE SERPL-SCNC: 108 MMOL/L — SIGNIFICANT CHANGE UP (ref 96–108)
CO2 SERPL-SCNC: 27 MMOL/L — SIGNIFICANT CHANGE UP (ref 22–31)
CREAT SERPL-MCNC: 1.43 MG/DL — HIGH (ref 0.5–1.3)
GLUCOSE SERPL-MCNC: 128 MG/DL — HIGH (ref 70–99)
HCT VFR BLD CALC: 37.5 % — LOW (ref 39–50)
HGB BLD-MCNC: 12.9 G/DL — LOW (ref 13–17)
POTASSIUM SERPL-MCNC: 4.1 MMOL/L — SIGNIFICANT CHANGE UP (ref 3.5–5.3)
POTASSIUM SERPL-SCNC: 4.1 MMOL/L — SIGNIFICANT CHANGE UP (ref 3.5–5.3)
SODIUM SERPL-SCNC: 143 MMOL/L — SIGNIFICANT CHANGE UP (ref 135–145)

## 2020-10-27 PROCEDURE — 73562 X-RAY EXAM OF KNEE 3: CPT | Mod: 26,LT

## 2020-10-27 PROCEDURE — 27447 TOTAL KNEE ARTHROPLASTY: CPT | Mod: LT

## 2020-10-27 PROCEDURE — 88305 TISSUE EXAM BY PATHOLOGIST: CPT | Mod: 26

## 2020-10-27 PROCEDURE — 88311 DECALCIFY TISSUE: CPT | Mod: 26

## 2020-10-27 PROCEDURE — 99223 1ST HOSP IP/OBS HIGH 75: CPT

## 2020-10-27 RX ORDER — OXYCODONE HYDROCHLORIDE 5 MG/1
5 TABLET ORAL
Refills: 0 | Status: DISCONTINUED | OUTPATIENT
Start: 2020-10-27 | End: 2020-10-28

## 2020-10-27 RX ORDER — ASPIRIN/CALCIUM CARB/MAGNESIUM 324 MG
81 TABLET ORAL EVERY 12 HOURS
Refills: 0 | Status: DISCONTINUED | OUTPATIENT
Start: 2020-10-28 | End: 2020-10-28

## 2020-10-27 RX ORDER — ONDANSETRON 8 MG/1
4 TABLET, FILM COATED ORAL EVERY 6 HOURS
Refills: 0 | Status: DISCONTINUED | OUTPATIENT
Start: 2020-10-27 | End: 2020-10-28

## 2020-10-27 RX ORDER — SENNA PLUS 8.6 MG/1
2 TABLET ORAL
Qty: 0 | Refills: 0 | DISCHARGE
Start: 2020-10-27

## 2020-10-27 RX ORDER — CEFAZOLIN SODIUM 1 G
2000 VIAL (EA) INJECTION ONCE
Refills: 0 | Status: COMPLETED | OUTPATIENT
Start: 2020-10-27 | End: 2020-10-27

## 2020-10-27 RX ORDER — SIMVASTATIN 20 MG/1
10 TABLET, FILM COATED ORAL AT BEDTIME
Refills: 0 | Status: DISCONTINUED | OUTPATIENT
Start: 2020-10-27 | End: 2020-10-28

## 2020-10-27 RX ORDER — ACETAMINOPHEN 500 MG
2 TABLET ORAL
Qty: 0 | Refills: 0 | DISCHARGE
Start: 2020-10-27

## 2020-10-27 RX ORDER — TRANEXAMIC ACID 100 MG/ML
1000 INJECTION, SOLUTION INTRAVENOUS ONCE
Refills: 0 | Status: COMPLETED | OUTPATIENT
Start: 2020-10-27 | End: 2020-10-27

## 2020-10-27 RX ORDER — OMEPRAZOLE 10 MG/1
1 CAPSULE, DELAYED RELEASE ORAL
Qty: 30 | Refills: 1
Start: 2020-10-27 | End: 2020-12-25

## 2020-10-27 RX ORDER — SODIUM CHLORIDE 9 MG/ML
1000 INJECTION, SOLUTION INTRAVENOUS
Refills: 0 | Status: DISCONTINUED | OUTPATIENT
Start: 2020-10-27 | End: 2020-10-28

## 2020-10-27 RX ORDER — ACETAMINOPHEN 500 MG
1000 TABLET ORAL EVERY 6 HOURS
Refills: 0 | Status: COMPLETED | OUTPATIENT
Start: 2020-10-27 | End: 2020-10-27

## 2020-10-27 RX ORDER — SODIUM CHLORIDE 9 MG/ML
1000 INJECTION, SOLUTION INTRAVENOUS
Refills: 0 | Status: DISCONTINUED | OUTPATIENT
Start: 2020-10-27 | End: 2020-10-27

## 2020-10-27 RX ORDER — ASPIRIN/CALCIUM CARB/MAGNESIUM 324 MG
1 TABLET ORAL
Qty: 84 | Refills: 0
Start: 2020-10-27 | End: 2020-12-07

## 2020-10-27 RX ORDER — MAGNESIUM HYDROXIDE 400 MG/1
30 TABLET, CHEWABLE ORAL DAILY
Refills: 0 | Status: DISCONTINUED | OUTPATIENT
Start: 2020-10-27 | End: 2020-10-28

## 2020-10-27 RX ORDER — AMLODIPINE BESYLATE 2.5 MG/1
5 TABLET ORAL DAILY
Refills: 0 | Status: DISCONTINUED | OUTPATIENT
Start: 2020-10-29 | End: 2020-10-28

## 2020-10-27 RX ORDER — CEFAZOLIN SODIUM 1 G
2000 VIAL (EA) INJECTION EVERY 8 HOURS
Refills: 0 | Status: COMPLETED | OUTPATIENT
Start: 2020-10-27 | End: 2020-10-28

## 2020-10-27 RX ORDER — SENNA PLUS 8.6 MG/1
2 TABLET ORAL AT BEDTIME
Refills: 0 | Status: DISCONTINUED | OUTPATIENT
Start: 2020-10-27 | End: 2020-10-28

## 2020-10-27 RX ORDER — PANTOPRAZOLE SODIUM 20 MG/1
40 TABLET, DELAYED RELEASE ORAL
Refills: 0 | Status: DISCONTINUED | OUTPATIENT
Start: 2020-10-27 | End: 2020-10-28

## 2020-10-27 RX ORDER — HYDROMORPHONE HYDROCHLORIDE 2 MG/ML
0.5 INJECTION INTRAMUSCULAR; INTRAVENOUS; SUBCUTANEOUS
Refills: 0 | Status: DISCONTINUED | OUTPATIENT
Start: 2020-10-27 | End: 2020-10-28

## 2020-10-27 RX ORDER — ONDANSETRON 8 MG/1
4 TABLET, FILM COATED ORAL ONCE
Refills: 0 | Status: DISCONTINUED | OUTPATIENT
Start: 2020-10-27 | End: 2020-10-27

## 2020-10-27 RX ORDER — CHLORHEXIDINE GLUCONATE 213 G/1000ML
1 SOLUTION TOPICAL ONCE
Refills: 0 | Status: COMPLETED | OUTPATIENT
Start: 2020-10-27 | End: 2020-10-27

## 2020-10-27 RX ORDER — ACETAMINOPHEN 500 MG
1000 TABLET ORAL EVERY 8 HOURS
Refills: 0 | Status: DISCONTINUED | OUTPATIENT
Start: 2020-10-28 | End: 2020-10-28

## 2020-10-27 RX ORDER — ACETAMINOPHEN 500 MG
1000 TABLET ORAL ONCE
Refills: 0 | Status: COMPLETED | OUTPATIENT
Start: 2020-10-27 | End: 2020-10-27

## 2020-10-27 RX ORDER — DORZOLAMIDE HYDROCHLORIDE TIMOLOL MALEATE 20; 5 MG/ML; MG/ML
1 SOLUTION/ DROPS OPHTHALMIC
Refills: 0 | Status: DISCONTINUED | OUTPATIENT
Start: 2020-10-27 | End: 2020-10-28

## 2020-10-27 RX ORDER — POLYETHYLENE GLYCOL 3350 17 G/17G
17 POWDER, FOR SOLUTION ORAL DAILY
Refills: 0 | Status: DISCONTINUED | OUTPATIENT
Start: 2020-10-27 | End: 2020-10-28

## 2020-10-27 RX ORDER — OXYCODONE HYDROCHLORIDE 5 MG/1
10 TABLET ORAL
Refills: 0 | Status: DISCONTINUED | OUTPATIENT
Start: 2020-10-27 | End: 2020-10-28

## 2020-10-27 RX ORDER — APREPITANT 80 MG/1
40 CAPSULE ORAL ONCE
Refills: 0 | Status: COMPLETED | OUTPATIENT
Start: 2020-10-27 | End: 2020-10-27

## 2020-10-27 RX ORDER — POLYETHYLENE GLYCOL 3350 17 G/17G
17 POWDER, FOR SOLUTION ORAL
Qty: 0 | Refills: 0 | DISCHARGE
Start: 2020-10-27

## 2020-10-27 RX ORDER — HYDROMORPHONE HYDROCHLORIDE 2 MG/ML
0.5 INJECTION INTRAMUSCULAR; INTRAVENOUS; SUBCUTANEOUS
Refills: 0 | Status: DISCONTINUED | OUTPATIENT
Start: 2020-10-27 | End: 2020-10-27

## 2020-10-27 RX ORDER — LEVOTHYROXINE SODIUM 125 MCG
50 TABLET ORAL DAILY
Refills: 0 | Status: DISCONTINUED | OUTPATIENT
Start: 2020-10-27 | End: 2020-10-28

## 2020-10-27 RX ORDER — SODIUM CHLORIDE 9 MG/ML
500 INJECTION INTRAMUSCULAR; INTRAVENOUS; SUBCUTANEOUS ONCE
Refills: 0 | Status: COMPLETED | OUTPATIENT
Start: 2020-10-27 | End: 2020-10-27

## 2020-10-27 RX ORDER — HYDROMORPHONE HYDROCHLORIDE 2 MG/ML
0.25 INJECTION INTRAMUSCULAR; INTRAVENOUS; SUBCUTANEOUS
Refills: 0 | Status: DISCONTINUED | OUTPATIENT
Start: 2020-10-27 | End: 2020-10-27

## 2020-10-27 RX ADMIN — DORZOLAMIDE HYDROCHLORIDE TIMOLOL MALEATE 1 DROP(S): 20; 5 SOLUTION/ DROPS OPHTHALMIC at 22:11

## 2020-10-27 RX ADMIN — Medication 1000 MILLIGRAM(S): at 18:11

## 2020-10-27 RX ADMIN — SODIUM CHLORIDE 100 MILLILITER(S): 9 INJECTION, SOLUTION INTRAVENOUS at 16:36

## 2020-10-27 RX ADMIN — SODIUM CHLORIDE 75 MILLILITER(S): 9 INJECTION, SOLUTION INTRAVENOUS at 13:57

## 2020-10-27 RX ADMIN — SODIUM CHLORIDE 500 MILLILITER(S): 9 INJECTION INTRAMUSCULAR; INTRAVENOUS; SUBCUTANEOUS at 16:36

## 2020-10-27 RX ADMIN — APREPITANT 40 MILLIGRAM(S): 80 CAPSULE ORAL at 10:35

## 2020-10-27 RX ADMIN — Medication 1000 MILLIGRAM(S): at 22:12

## 2020-10-27 RX ADMIN — SIMVASTATIN 10 MILLIGRAM(S): 20 TABLET, FILM COATED ORAL at 22:11

## 2020-10-27 RX ADMIN — Medication 100 MILLIGRAM(S): at 18:53

## 2020-10-27 RX ADMIN — Medication 400 MILLIGRAM(S): at 17:45

## 2020-10-27 RX ADMIN — SODIUM CHLORIDE 100 MILLILITER(S): 9 INJECTION, SOLUTION INTRAVENOUS at 22:12

## 2020-10-27 RX ADMIN — CHLORHEXIDINE GLUCONATE 1 APPLICATION(S): 213 SOLUTION TOPICAL at 10:35

## 2020-10-27 RX ADMIN — Medication 400 MILLIGRAM(S): at 22:12

## 2020-10-27 NOTE — DISCHARGE NOTE NURSING/CASE MANAGEMENT/SOCIAL WORK - PATIENT PORTAL LINK FT
You can access the FollowMyHealth Patient Portal offered by [facility] by registering at the following website: [email or web address]. By joining [facility]’s FollowMyHealth portal, you will also be able to view your health information using other applications (apps) compatible with our system.

## 2020-10-27 NOTE — DISCHARGE NOTE PROVIDER - NSDCACTIVITY_GEN_ALL_CORE
Stairs allowed/No heavy lifting/straining/Walking - Outdoors allowed/Showering allowed/Walking - Indoors allowed/Do not drive or operate machinery

## 2020-10-27 NOTE — DISCHARGE NOTE PROVIDER - HOSPITAL COURSE
This patient was admitted to [facility] with a history of severe degenerative joint disease of the left knee.  Patient went to Pre-Surgical Testing at [facility] and was medically cleared to undergo elective procedure. Patient underwent left total knee replacement by Dr. Lomeli on 10/27/2020. Procedure was well tolerated.  No operative or prerna-operative complications arose during patients hospital course.  Patient received antibiotic according to SCIP guidelines for infection prevention.  Aspirin was given for DVT prophylaxis.  Anesthesia, Medical Hospitalist, Physical Therapy and Occupational Therapy were consulted. Patient is stable for discharge with a good prognosis.  Appropriate discharge instructions and medications are provided in this document. This patient was admitted to [facility] with a history of severe degenerative joint disease of the left knee.  Patient went to Pre-Surgical Testing at [facility] and was medically cleared to undergo elective procedure. Patient underwent left total knee replacement by Dr. Ronald Lomeli on 10/27/2020. Procedure was well tolerated.  No operative or prerna-operative complications arose during patient's hospital course.  Patient received antibiotic according to SCIP guidelines for infection prevention.  Aspirin was given for DVT prophylaxis.  Anesthesia, Medical Hospitalist, Physical Therapy and Occupational Therapy were consulted. Patient is stable for discharge home with a good prognosis om 10/28/20.  Appropriate discharge instructions and medications are provided in this document.

## 2020-10-27 NOTE — DISCHARGE NOTE NURSING/CASE MANAGEMENT/SOCIAL WORK - NSSCNAMETXT_GEN_ALL_CORE
[facility] [phone] or  [phone]  Nurse to visit the day after hospital discharge; physical therapist to follow. Please contact the home care agency at the above phone number if you have not heard from them by 12 noon on the day after your hospital discharge.

## 2020-10-27 NOTE — DISCHARGE NOTE PROVIDER - CARE PROVIDER_API CALL
Ronald Lomeli)  Orthopaedic Surgery  [address]  [place] [address]  Phone: [phone]  Fax: [phone]  Scheduled Appointment: 11/11/2020 04:45 AM   Ronlad Lomeli)  Orthopaedic Surgery  [address]  [place] [address]  Phone: [phone]  Fax: [phone]  Scheduled Appointment: 11/11/2020 04:45 PM

## 2020-10-27 NOTE — CONSULT NOTE ADULT - SUBJECTIVE AND OBJECTIVE BOX
Patient is a 89y old  Male who presents with a chief complaint of left knee pain    HPI: 89M presented with several year history of progressively worsening left knee pain. He has had injections "years ago" without significant improvement. He rates the pain at 8/10 and "can hardly walk." He was taking Tylenol 1000mg 2xdaily with minimal relief.  In PACU patient had urine retention and ferraro was placed.   currently pain is controlled and he has no further complaints.     REVIEW OF SYSTEMS:  CONSTITUTIONAL: No fever, weight loss, or fatigue  EYES: No eye pain, visual disturbances, or discharge  ENMT:  No difficulty hearing, tinnitus, vertigo; No sinus or throat pain  NECK: No pain or stiffness  BREASTS: No pain, masses, or nipple discharge  RESPIRATORY: No cough, wheezing, chills or hemoptysis; No shortness of breath  CARDIOVASCULAR: No chest pain, palpitations, dizziness, or leg swelling  GASTROINTESTINAL: No abdominal or epigastric pain. No nausea, vomiting, or hematemesis; No diarrhea or constipation. No melena or hematochezia.  GENITOURINARY: No dysuria, frequency, hematuria, or incontinence  NEUROLOGICAL: No headaches, memory loss, loss of strength, numbness, or tremors  SKIN: No itching, burning, rashes, or lesions   LYMPH NODES: No enlarged glands  ENDOCRINE: No heat or cold intolerance; No hair loss  MUSCULOSKELETAL: No muscle or back pain  PSYCHIATRIC: No depression, anxiety, mood swings, or difficulty sleeping  HEME/LYMPH: No easy bruising, or bleeding gums  ALLERGY AND IMMUNOLOGIC: No hives or eczema    PAST MEDICAL & SURGICAL HISTORY:  [place] (hard of hearing)  H/O: glaucoma  Hypothyroid  HTN (hypertension)  S/P total knee replacement, right 2020- right knee  History of ear surgery    SOCIAL HISTORY:  Residence: [ ] [facility]  [ ] [facility]  [x ] [place]  [ ] Substance abuse: denies  [ ] Tobacco: denies  [ ] Alcohol use: rarely    Allergies  celecoxib (Rash)  meloxicam (Rash)    MEDICATIONS  (STANDING):  acetaminophen  IVPB .. 1000 milliGRAM(s) IV Intermittent every 6 hours  acetaminophen  IVPB .. 1000 milliGRAM(s) IV Intermittent every 6 hours  amLODIPine   Tablet 5 milliGRAM(s) Oral daily  ceFAZolin   IVPB 2000 milliGRAM(s) IV Intermittent every 8 hours  dorzolamide 2%/timolol 0.5% Ophthalmic Solution 1 Drop(s) Both EYES two times a day  lactated ringers. 1000 milliLiter(s) (75 mL/Hr) IV Continuous <Continuous>  lactated ringers. 1000 milliLiter(s) (100 mL/Hr) IV Continuous <Continuous>  levothyroxine 50 MICROGram(s) Oral daily  pantoprazole    Tablet 40 milliGRAM(s) Oral before breakfast  senna 2 Tablet(s) Oral at bedtime  simvastatin 10 milliGRAM(s) Oral at bedtime    MEDICATIONS  (PRN):  aluminum hydroxide/magnesium hydroxide/simethicone Suspension 30 milliLiter(s) Oral four times a day PRN Indigestion  HYDROmorphone  Injectable 0.5 milliGRAM(s) IV Push every 3 hours PRN breakthrough pain  HYDROmorphone  Injectable 0.25 milliGRAM(s) IV Push every 10 minutes PRN Moderate Pain (4 - 6)  HYDROmorphone  Injectable 0.5 milliGRAM(s) IV Push every 10 minutes PRN Severe Pain (7 - 10)  magnesium hydroxide Suspension 30 milliLiter(s) Oral daily PRN Constipation  ondansetron Injectable 4 milliGRAM(s) IV Push every 6 hours PRN Nausea and/or Vomiting  ondansetron Injectable 4 milliGRAM(s) IV Push once PRN Nausea and/or Vomiting  oxyCODONE    IR 5 milliGRAM(s) Oral every 3 hours PRN Moderate Pain (4 - 6)  oxyCODONE    IR 10 milliGRAM(s) Oral every 3 hours PRN Severe Pain (7 - 10)  polyethylene glycol 3350 17 Gram(s) Oral daily PRN Constipation      FAMILY HISTORY:  Family history of stroke or transient ischemic attack in daughter  daughter-    Family history of myocardial infarction at age less than 60  brother-        Vital Signs Last 24 Hrs  T(C): 36.3 (27 Oct 2020 15:50), Max: 36.3 (27 Oct 2020 15:50)  T(F): 97.3 (27 Oct 2020 15:50), Max: 97.3 (27 Oct 2020 15:50)  HR: 53 (27 Oct 2020 15:50) (50 - 65)  BP: 148/68 (27 Oct 2020 15:50) (121/72 - 167/66)  BP(mean): --  RR: 16 (27 Oct 2020 15:50) (11 - 19)  SpO2: 95% (27 Oct 2020 15:50) (94% - 100%)    PHYSICAL EXAM:    GENERAL: NAD, well-groomed, well-developed  HEAD:  Atraumatic, Normocephalic  EYES: conjunctiva and sclera clear  ENMT: Moist mucous membranes  NECK: Supple, No JVD, Normal thyroid  NERVOUS SYSTEM:  Alert & Oriented X3, Good concentration; Moving all 4 extremities; No gross sensory deficits  CHEST/LUNG: Clear to auscultation bilaterally; No rales, rhonchi, wheezing, or rubs  HEART: Regular rate and rhythm; No murmurs, rubs, or gallops  ABDOMEN: Soft, Nontender, Nondistended; Bowel sounds present  EXTREMITIES:  2+ Peripheral Pulses, No clubbing, cyanosis, or edema  LYMPH: No lymphadenopathy noted  /RECTAL: Not examined  BREAST: Not examined  SKIN: No rashes or lesions  INCISION: dressing dry and intact.     LABS:      CAPILLARY BLOOD GLUCOSE      RADIOLOGY & ADDITIONAL STUDIES:    EKG: NSR  Personally Reviewed:  [ x] YES     Imaging: TKR in place  Personally Reviewed:  [x ] YES     Consultant(s) Notes Reviewed:  pre-op med clearance in place    Care Discussed with Consultants/Other Providers:

## 2020-10-27 NOTE — CONSULT NOTE ADULT - PROBLEM SELECTOR RECOMMENDATION 9
Pain Management: acceptable- continue current care Tylenol ATC/ Oxycodone PRN  Continue PT/OT  DVT proph: [ x ] low risk - Aspirin  [  ] high risk -Lovenox [  ] high risk - Eliquis [  ] other:_________  DC plan:  [ x ] Home with HC  [  ] Rehab   [  ] TBD  [  ]other:___________

## 2020-10-27 NOTE — DISCHARGE NOTE NURSING/CASE MANAGEMENT/SOCIAL WORK - NSDCDMETYPESERV_GEN_ALL_CORE_FT
As per [employer] medical equipment company, you have rolling walker and commode prior to this admission

## 2020-10-27 NOTE — DISCHARGE NOTE PROVIDER - NSDCMRMEDTOKEN_GEN_ALL_CORE_FT
acetaminophen 500 mg oral tablet: 2 tab(s) orally every 12 hours  acetaminophen 500 mg oral tablet: 2 tab(s) orally every 12 hours  amLODIPine 5 mg oral tablet: 1 tab(s) orally once a day  aspirin 81 mg oral delayed release tablet: 1 tab(s) orally every 12 hours   aspirin 81 mg oral tablet: 1 tab(s) orally once a day  Colace 100 mg oral capsule: 1 cap(s) orally every other day  Colace 100 mg oral capsule: 2 cap(s) orally every other day  dorzolamide-timolol 2.23%-0.68% ophthalmic solution: 1 drop(s) to each affected eye 2 times a day  levothyroxine 50 mcg (0.05 mg) oral tablet: 1 tab(s) orally once a day  mupirocin 2% topical ointment: Apply topically in each nostril 2 times a day x5 days  omeprazole 20 mg oral delayed release tablet: 1 tab(s) orally once a day   polyethylene glycol 3350 oral powder for reconstitution: 17 gram(s) orally once a day, As needed, Constipation  senna oral tablet: 2 tab(s) orally once a day (at bedtime)  simvastatin 10 mg oral tablet: 1 tab(s) orally once a day (at bedtime)  ZyrTEC 10 mg oral tablet: 1 tab(s) orally once a day (at bedtime)   acetaminophen 500 mg oral tablet: 2 tab(s) orally every 12 hours  amLODIPine 5 mg oral tablet: 1 tab(s) orally once a day  aspirin 81 mg oral delayed release tablet: 1 tab(s) orally every 12 hours   Colace 100 mg oral capsule: 1 cap(s) orally every other day  Colace 100 mg oral capsule: 2 cap(s) orally every other day  dorzolamide-timolol 2.23%-0.68% ophthalmic solution: 1 drop(s) to each affected eye 2 times a day  levothyroxine 50 mcg (0.05 mg) oral tablet: 1 tab(s) orally once a day  omeprazole 20 mg oral delayed release tablet: 1 tab(s) orally once a day   oxyCODONE 5 mg oral tablet: 1 tab(s) orally every 4 to 6 hours, As Needed for pain MDD:6 tabs  polyethylene glycol 3350 oral powder for reconstitution: 17 gram(s) orally once a day, As needed, Constipation  senna oral tablet: 2 tab(s) orally once a day (at bedtime)  silver sulfADIAZINE 1% topical cream: 1 application topically 2 times a day  simvastatin 10 mg oral tablet: 1 tab(s) orally once a day (at bedtime)  ZyrTEC 10 mg oral tablet: 1 tab(s) orally once a day (at bedtime)

## 2020-10-27 NOTE — DISCHARGE NOTE PROVIDER - NSDCCPTREATMENT_GEN_ALL_CORE_FT
PRINCIPAL PROCEDURE  Procedure: Left total knee replacement  Findings and Treatment: For your total knee replacement:  Physical Therapy/Occupational Therapy for: ambulation, transfers, stairs, ADL's (activities of daily living), range of motion exercises, and isometrics  -Activity  • Weight Bearing as tolerated with rolling walker.  • Take short, frequent walks increasing the distance that you walk each day as tolerated.  • Change your position every hour to decrease pain and stiffness.  • Continue the exercises taught to you by your physical therapist.  • No driving until cleared by the doctor.  • No tub baths, hot tubs, or swimming pools until instructed by your doctor.  • Do not squat down on the floor.  • Do not kneel or twist your knee.  • Range of Motion Goals: Flexion= 120 degrees, Extension = 0 degrees  Keep incision clean and dry. May shower after surgery if no drainage from incision. Do not soak, scrub, or submerge incision in water. Do not apply any lotions, ointments, or creams to surgical site.  Follow up with surgeon in the office in about 14 days for suture removal.       PRINCIPAL PROCEDURE  Procedure: Left total knee replacement  Findings and Treatment: end stage DJD.

## 2020-10-27 NOTE — DISCHARGE NOTE PROVIDER - PROVIDER TOKENS
PROVIDER:[[id number]],SCHEDULEDAPPT:[11/11/2020],SCHEDULEDAPPTTIME:[04:45 AM]] PROVIDER:[TOKEN:[[id number]],SCHEDULEDAPPT:[11/11/2020],SCHEDULEDAPPTTIME:[04:45 PM]]

## 2020-10-27 NOTE — DISCHARGE NOTE PROVIDER - NSDCCPCAREPLAN_GEN_ALL_CORE_FT
PRINCIPAL DISCHARGE DIAGNOSIS  Diagnosis: Osteoarthritis of left knee  Assessment and Plan of Treatment: s/p left total knee replacement       PRINCIPAL DISCHARGE DIAGNOSIS  Diagnosis: Osteoarthritis of left knee  Assessment and Plan of Treatment: Your new total knee requires proper care.  Your care provider is your best resource for care information.  Please follow these basic guidelines.  Use pain medication if needed as prescribed.  Ice packs are a helpful addition to your comfort.  Applying a  waterproof ice pack over a cloth or thin towel to the site for 30 minutes per hour may provide benefit by reducing swelling and discomfort.  Your Physical Therapy/Occupational Therapy may include ambulation, transfers, stairs, ADL's (activities of daily living), range of motion exercises, and isometrics.  Your participation is vital to your recovery.  -Your Activity  • Weight Bearing as tolerated with rolling walker.  • Take short, frequent walks increasing the distance that you walk each day as tolerated.  • Change your position every hour to decrease pain and stiffness.  • Continue the exercises taught to you by your physical therapist.  • No driving until cleared by the doctor.  • No tub baths, hot tubs, or swimming pools until instructed by your doctor.  • Do not squat down on the floor.  • Do not kneel or twist your knee.  Keep incision clean and dry. Change the dressing daily if there is drainage noted from surgical wound. When there is no drainage, the wound may be left open to air.  Salves, ointments or other topical treatments are not to be used on the wound unless specifically recommended by your doctor.   If you have sutures (stiches) and no drainage from the incision, you may shower allowing water to rinse the incision and pat it  dry afterward with a clean towel.  If you have Prineo (tape/glue), you may shower and pat the wound dry.    Suture and/or Prineo removal is done in the office 2 weeks after surgery.  If you have further questions or problems call your doctor's office.

## 2020-10-27 NOTE — PROGRESS NOTE ADULT - SUBJECTIVE AND OBJECTIVE BOX
Orthopedic Post-op Check Note  Post Op Day # 0    SUBJECTIVE    89yo Male status post left TKR .   Patient is alert and comfortable.    Pain is controlled with current pain regimen.  Denies nausea, vomiting, chest pain, shortness of breath, abdominal pain.   No new complaints. He reports ambulating with PT in the hallway.    OBJECTIVE    Vital Signs Last 24 Hrs  T(C): 36.3 (27 Oct 2020 15:50), Max: 36.3 (27 Oct 2020 15:50)  T(F): 97.3 (27 Oct 2020 15:50), Max: 97.3 (27 Oct 2020 15:50)  HR: 53 (27 Oct 2020 15:50) (50 - 65)  BP: 148/68 (27 Oct 2020 15:50) (121/72 - 167/66)  BP(mean): --  RR: 16 (27 Oct 2020 15:50) (11 - 19)  SpO2: 95% (27 Oct 2020 15:50) (94% - 100%)  I&O's Summary    27 Oct 2020 07:01  -  27 Oct 2020 16:58  --------------------------------------------------------  IN: 1750 mL / OUT: 600 mL / NET: 1150 mL        PHYSICAL EXAM    Left knee dressing  is clean, dry and intact.   TA/GS/EHL 5/5 bilaterally  Compartments soft, non-tender bilaterally   Sensation to light touch is grossly intact distally.   (2+) dorsalis pedis pulse. Capillary refill is less than 2 seconds. No cyanosis.        ASSESSMENT AND PLAN  - Orthopedically stable post-op  - DVT prophylaxis: PAS + Aspirin 81mg q 12hr  - Continue physical therapy and occupational therapy  - Weight bearing as tolerated of the left lower extremity with assistance of a walker  - Incentive spirometry encouraged  - Bowel regimen  - Pain control as clinically indicated  - Disposition:  Home tomorrow if cleared by medicine and PT/OT

## 2020-10-27 NOTE — PHYSICAL THERAPY INITIAL EVALUATION ADULT - ADDITIONAL COMMENTS
has no stairs in apt in [facility]. will stay at [facility] house without stairs

## 2020-10-28 VITALS
DIASTOLIC BLOOD PRESSURE: 74 MMHG | SYSTOLIC BLOOD PRESSURE: 147 MMHG | HEART RATE: 85 BPM | TEMPERATURE: 98 F | OXYGEN SATURATION: 93 % | RESPIRATION RATE: 18 BRPM

## 2020-10-28 LAB
ANION GAP SERPL CALC-SCNC: 7 MMOL/L — SIGNIFICANT CHANGE UP (ref 5–17)
BUN SERPL-MCNC: 15 MG/DL — SIGNIFICANT CHANGE UP (ref 7–23)
CALCIUM SERPL-MCNC: 8.9 MG/DL — SIGNIFICANT CHANGE UP (ref 8.4–10.5)
CHLORIDE SERPL-SCNC: 109 MMOL/L — HIGH (ref 96–108)
CO2 SERPL-SCNC: 27 MMOL/L — SIGNIFICANT CHANGE UP (ref 22–31)
CREAT SERPL-MCNC: 1.39 MG/DL — HIGH (ref 0.5–1.3)
GLUCOSE SERPL-MCNC: 121 MG/DL — HIGH (ref 70–99)
HCT VFR BLD CALC: 39.8 % — SIGNIFICANT CHANGE UP (ref 39–50)
HGB BLD-MCNC: 13.5 G/DL — SIGNIFICANT CHANGE UP (ref 13–17)
MCHC RBC-ENTMCNC: 33.9 GM/DL — SIGNIFICANT CHANGE UP (ref 32–36)
MCHC RBC-ENTMCNC: 36 PG — HIGH (ref 27–34)
MCV RBC AUTO: 106.1 FL — HIGH (ref 80–100)
NRBC # BLD: 0 /100 WBCS — SIGNIFICANT CHANGE UP (ref 0–0)
PLATELET # BLD AUTO: 253 K/UL — SIGNIFICANT CHANGE UP (ref 150–400)
POTASSIUM SERPL-MCNC: 4.6 MMOL/L — SIGNIFICANT CHANGE UP (ref 3.5–5.3)
POTASSIUM SERPL-SCNC: 4.6 MMOL/L — SIGNIFICANT CHANGE UP (ref 3.5–5.3)
RBC # BLD: 3.75 M/UL — LOW (ref 4.2–5.8)
RBC # FLD: 12.9 % — SIGNIFICANT CHANGE UP (ref 10.3–14.5)
SODIUM SERPL-SCNC: 143 MMOL/L — SIGNIFICANT CHANGE UP (ref 135–145)
WBC # BLD: 12.56 K/UL — HIGH (ref 3.8–10.5)
WBC # FLD AUTO: 12.56 K/UL — HIGH (ref 3.8–10.5)

## 2020-10-28 PROCEDURE — 85018 HEMOGLOBIN: CPT

## 2020-10-28 PROCEDURE — 85027 COMPLETE CBC AUTOMATED: CPT

## 2020-10-28 PROCEDURE — 97110 THERAPEUTIC EXERCISES: CPT

## 2020-10-28 PROCEDURE — 97165 OT EVAL LOW COMPLEX 30 MIN: CPT

## 2020-10-28 PROCEDURE — 97530 THERAPEUTIC ACTIVITIES: CPT

## 2020-10-28 PROCEDURE — 88311 DECALCIFY TISSUE: CPT

## 2020-10-28 PROCEDURE — 85014 HEMATOCRIT: CPT

## 2020-10-28 PROCEDURE — C1713: CPT

## 2020-10-28 PROCEDURE — 86850 RBC ANTIBODY SCREEN: CPT

## 2020-10-28 PROCEDURE — 97161 PT EVAL LOW COMPLEX 20 MIN: CPT

## 2020-10-28 PROCEDURE — 80048 BASIC METABOLIC PNL TOTAL CA: CPT

## 2020-10-28 PROCEDURE — 73562 X-RAY EXAM OF KNEE 3: CPT

## 2020-10-28 PROCEDURE — 99232 SBSQ HOSP IP/OBS MODERATE 35: CPT

## 2020-10-28 PROCEDURE — 97535 SELF CARE MNGMENT TRAINING: CPT

## 2020-10-28 PROCEDURE — 36415 COLL VENOUS BLD VENIPUNCTURE: CPT

## 2020-10-28 PROCEDURE — 86900 BLOOD TYPING SEROLOGIC ABO: CPT

## 2020-10-28 PROCEDURE — 86901 BLOOD TYPING SEROLOGIC RH(D): CPT

## 2020-10-28 PROCEDURE — C1776: CPT

## 2020-10-28 PROCEDURE — 88305 TISSUE EXAM BY PATHOLOGIST: CPT

## 2020-10-28 PROCEDURE — 97116 GAIT TRAINING THERAPY: CPT

## 2020-10-28 RX ORDER — OXYCODONE HYDROCHLORIDE 5 MG/1
1 TABLET ORAL
Qty: 42 | Refills: 0
Start: 2020-10-28

## 2020-10-28 RX ADMIN — Medication 1000 MILLIGRAM(S): at 06:02

## 2020-10-28 RX ADMIN — SODIUM CHLORIDE 100 MILLILITER(S): 9 INJECTION, SOLUTION INTRAVENOUS at 08:55

## 2020-10-28 RX ADMIN — PANTOPRAZOLE SODIUM 40 MILLIGRAM(S): 20 TABLET, DELAYED RELEASE ORAL at 06:02

## 2020-10-28 RX ADMIN — Medication 100 MILLIGRAM(S): at 02:51

## 2020-10-28 RX ADMIN — Medication 50 MICROGRAM(S): at 06:02

## 2020-10-28 RX ADMIN — DORZOLAMIDE HYDROCHLORIDE TIMOLOL MALEATE 1 DROP(S): 20; 5 SOLUTION/ DROPS OPHTHALMIC at 06:02

## 2020-10-28 RX ADMIN — Medication 1000 MILLIGRAM(S): at 06:03

## 2020-10-28 RX ADMIN — Medication 81 MILLIGRAM(S): at 06:02

## 2020-10-28 RX ADMIN — Medication 1000 MILLIGRAM(S): at 14:43

## 2020-10-28 NOTE — PROGRESS NOTE ADULT - SUBJECTIVE AND OBJECTIVE BOX
Discharge medication calendar:  (ASA 81mg Qday preop)  Aspirin EC 81mg q12h x 6 weeks then resume ASA 81mg Qday  APAP 1000mg q8h x 2-3 weeks  No NSAID   Omeprazole 20mg QAM x 6 weeks  Narcotic PRN  Docusate 100mg TID while taking narcotic  Miralax, Senna, or Bisacodyl PRN for treatment of constipation

## 2020-10-28 NOTE — PROGRESS NOTE ADULT - PROBLEM SELECTOR PLAN 1
Pain Management: acceptable- continue current care Tylenol ATC/Oxycodone PRN  Continue PT/OT  DVT proph: [ x ] low risk - Aspirin    DC plan:  [ x ] Home with HC - probably later today if cleared by pt  post op urine retention resolved.

## 2020-10-28 NOTE — PROGRESS NOTE ADULT - SUBJECTIVE AND OBJECTIVE BOX
ORTHOPEDIC ATTENDING PROGRESS NOTE  DRAKE ELISE      89y Male                                                                                                                               POD # 1       STATUS POST:               Pre-Op Dx: Osteoarthritis of left knee      Post-Op Dx:  Osteoarthritis of left knee      Procedure: Left total knee replacement                                                  Pain (0-10):  Pt reports  Current Pain Management:  [ ] PCA   [x ] Po Analgesics [ ] IM /IV Anagesics     T(F): 97.6  HR: 66  BP: 147/71  RR: 18  SpO2: 92%                         13.5   12.56 )-----------( 253      ( 28 Oct 2020 05:54 )             39.8         10-28    143  |  109<H>  |  15  ----------------------------<  121<H>  4.6   |  27  |  1.39<H>    Ca    8.9      28 Oct 2020 05:54      Physical Exam :    -  Dressing C/D/I.   -   Distal Neurvascular status intact grossly.   -   Warm well perfused; capillary refill <3 seconds   -   (+)EHL/FHL   -   (+) Sensation to light touch  -   (-) Calf tenderness Bilaterally    A/P: 89y Male s/p Left total knee replacement       -   Ortho Stable  -   Pain control   -   Medicine to follow  -   DVT ppx:     [ ]SCDs     [x ] ASA     [ ] Eliquis     [ ] Lovenox  -   Weight bearing status:  WBAT [ x]        PWB    [ ]     TTWB  [ ]      NWB  [ ]   -  Dispo:     Home [x ]     Acute Rehab [ ]     NICOLE [ ]     TBD [ ]

## 2020-10-28 NOTE — OCCUPATIONAL THERAPY INITIAL EVALUATION ADULT - ADDITIONAL COMMENTS
*[place]* wears hearing aid  Will stay at [facility]'s +elevator +tub Owns a commode, shower chair, RW SAC

## 2020-10-28 NOTE — PROGRESS NOTE ADULT - SUBJECTIVE AND OBJECTIVE BOX
Patient is a 89y old  Male who presents with a chief complaint of left total knee replacement for left knee osteoarthritis (27 Oct 2020 17:04)    INTERVAL HPI/OVERNIGHT EVENTS:  feels well, no complaints, pain controlled.  ferraro removed this am, has already voided and has no symptoms at this time.  no complaints.     MEDICATIONS  (STANDING):  acetaminophen   Tablet .. 1000 milliGRAM(s) Oral every 8 hours  amLODIPine   Tablet 5 milliGRAM(s) Oral daily  aspirin enteric coated 81 milliGRAM(s) Oral every 12 hours  dorzolamide 2%/timolol 0.5% Ophthalmic Solution 1 Drop(s) Both EYES two times a day  lactated ringers. 1000 milliLiter(s) (100 mL/Hr) IV Continuous <Continuous>  levothyroxine 50 MICROGram(s) Oral daily  pantoprazole    Tablet 40 milliGRAM(s) Oral before breakfast  senna 2 Tablet(s) Oral at bedtime  silver sulfADIAZINE 1% Cream 1 Application(s) Topical two times a day  simvastatin 10 milliGRAM(s) Oral at bedtime    MEDICATIONS  (PRN):  aluminum hydroxide/magnesium hydroxide/simethicone Suspension 30 milliLiter(s) Oral four times a day PRN Indigestion  HYDROmorphone  Injectable 0.5 milliGRAM(s) IV Push every 3 hours PRN breakthrough pain  magnesium hydroxide Suspension 30 milliLiter(s) Oral daily PRN Constipation  ondansetron Injectable 4 milliGRAM(s) IV Push every 6 hours PRN Nausea and/or Vomiting  oxyCODONE    IR 5 milliGRAM(s) Oral every 3 hours PRN Moderate Pain (4 - 6)  oxyCODONE    IR 10 milliGRAM(s) Oral every 3 hours PRN Severe Pain (7 - 10)  polyethylene glycol 3350 17 Gram(s) Oral daily PRN Constipation      Allergies  celecoxib (Rash)  meloxicam (Rash)    REVIEW OF SYSTEMS:  CONSTITUTIONAL: No fever, weight loss, or fatigue  EYES: No eye pain, visual disturbances, or discharge  ENMT:  No difficulty hearing, tinnitus, vertigo; No sinus or throat pain  NECK: No pain or stiffness  BREASTS: No pain, masses, or nipple discharge  RESPIRATORY: No cough, wheezing, chills or hemoptysis; No shortness of breath  CARDIOVASCULAR: No chest pain, palpitations, or lightheadedness  GASTROINTESTINAL: No abdominal or epigastric pain. No nausea, vomiting, or hematemesis; No diarrhea or constipation. No melena or hematochezia.  GENITOURINARY: No dysuria, frequency, hematuria, or incontinence  NEUROLOGICAL: No headaches, vertigo, memory loss, loss of strength, numbness, or tremors  SKIN: No itching, burning, rashes, or lesions   LYMPH NODES: No enlarged glands  ENDOCRINE: No heat or cold intolerance; No hair loss; No polydipsia or polyuria  MUSCULOSKELETAL: No back pain  PSYCHIATRIC: No depression, anxiety, or mood swings  HEME/LYMPH: No easy bruising, or bleeding gums  ALLERGY AND IMMUNOLOGIC: No hives or eczema    Vital Signs Last 24 Hrs  T(C): 36.4 (28 Oct 2020 07:51), Max: 36.4 (27 Oct 2020 19:21)  T(F): 97.6 (28 Oct 2020 07:51), Max: 97.6 (27 Oct 2020 19:21)  HR: 66 (28 Oct 2020 07:51) (50 - 66)  BP: 147/71 (28 Oct 2020 07:51) (113/53 - 161/61)  BP(mean): --  RR: 18 (28 Oct 2020 07:51) (11 - 19)  SpO2: 92% (28 Oct 2020 07:51) (92% - 100%)    PHYSICAL EXAM:  GENERAL: NAD, well-groomed, well-developed  HEAD:  Atraumatic, Normocephalic  EYES: , conjunctiva and sclera clear  ENMT: Moist mucous membranes  NECK: Supple, No JVD,  NERVOUS SYSTEM:  Alert & Oriented X3, Good concentration; Bilateral LE mobile, sensation to light touch intact  CHEST/LUNG: Clear to auscultation bilaterally; No rales, rhonchi, wheezing, or rubs  HEART: Regular rate and rhythm; No murmurs, rubs, or gallops  ABDOMEN: Soft, Nontender, Nondistended; Bowel sounds present  EXTREMITIES:  2+ Peripheral Pulses, No clubbing or cyanosis  LYMPH: No lymphadenopathy noted  SKIN: No rashes or lesions  INCISION:  Dressing dry and intact    LABS:                        13.5   12.56 )-----------( 253      ( 28 Oct 2020 05:54 )             39.8     28 Oct 2020 05:54    143    |  109    |  15     ----------------------------<  121    4.6     |  27     |  1.39     Ca    8.9        28 Oct 2020 05:54          CAPILLARY BLOOD GLUCOSE          RADIOLOGY & ADDITIONAL TESTS:    Imaging Personally Reviewed:      [ ] Consultant(s) Notes Reviewed  [x] Care Discussed with Consultants/Other Providers:  Ortho PA- plan of care

## 2020-10-28 NOTE — OCCUPATIONAL THERAPY INITIAL EVALUATION ADULT - ANTICIPATED DISCHARGE DISPOSITION, OT EVAL
home w/ level of assist/home w/ OT/Home with Home Occupational Therapy for home safety evaluation, functional mobility and ADL/IADL performance.

## 2020-11-11 ENCOUNTER — APPOINTMENT (OUTPATIENT)
Dept: ORTHOPEDIC SURGERY | Facility: CLINIC | Age: 85
End: 2020-11-11
Payer: MEDICARE

## 2020-11-11 VITALS — WEIGHT: 142 LBS | BODY MASS INDEX: 26.13 KG/M2 | HEIGHT: 62 IN

## 2020-11-11 PROCEDURE — 99024 POSTOP FOLLOW-UP VISIT: CPT

## 2020-11-11 PROCEDURE — 73560 X-RAY EXAM OF KNEE 1 OR 2: CPT | Mod: LT

## 2020-11-11 NOTE — PHYSICAL EXAM
[de-identified] : Constitutional\par o Appearance : well-nourished, well developed, alert, in no acute distress \par Head and Face\par o Head :\par ¦ Inspection : atraumatic, normocephalic\par o Face :\par ¦ Inspection : no visible rash or discoloration\par Respiratory\par o Respiratory Effort: breathing unlabored \par Neurologic\par o Mental Status Examination :\par ¦ Orientation : alert and oriented X 3\par Psychiatric\par o Mood and Affect: mood normal, affect appropriate\par Cardiovascular\par o Observation/Palpation : - no swelling\par Lymphatic\par o Additional Nodes : No palpable lymph nodes present\par \par Right Lower Extremity\par o Buttock : no tenderness, swelling or deformities \par o Right Hip :\par ¦ Inspection/Palpation : no tenderness, swelling or deformities\par ¦ Range of Motion : full and painless in all planes, no crepitance\par ¦ Stability : joint stability intact\par ¦ Strength : extension, flexion, adduction, abduction, internal rotation and external rotation, 5/5\par \par o Right Knee :\par ¦ Inspection/Palpation : no tenderness, no swelling, well-healed incision\par ¦ Range of Motion : 2-118° easily, no crepitance\par ¦ Stability : no valgus or varus instability present on provocative testing\par ¦ Strength : flexion and extension 5/5\par ¦ Tests and Signs : negative Anterior Drawer\par \par Left Lower Extremity\par o Buttock : no tenderness, swelling or deformities \par o Left Hip :\par ¦ Inspection/Palpation : no tenderness, no swelling or deformities\par ¦ Range of Motion : full and painless in all planes, no crepitance\par ¦ Stability : joint stability intact\par ¦ Strength : extension, flexion, adduction, abduction, internal rotation and external rotation, 4+/5\par \par o Left Knee :\par ¦ Inspection/Palpation : no tenderness to palpation, minimal swelling, sutures were removed, steri strips and benzoin were applied, well-healed incisions \par ¦ Range of Motion : 0-110°, no crepitance, good patellofemoral tracking\par ¦ Stability : no valgus or varus instability present on provocative testing\par ¦ Strength : flexion and extension 4+/5, can SLR, no extensor lag\par ¦ Tests and Signs : negative Anterior Drawer\par \par o Peripheral Vascular System :\par ¦ Dorsalis Pedis Artery : pulse 2+ bilaterally\par ¦ Posterior Tibial Artery : pulse 2+ bilaterally \par \par Gait and Station:\par Gait: ambulating with a walker, no significant extremity swelling or lymphedema, good proprioception and balance\par \par Radiology Results:\par o Left Knee : Standing AP and lateral views of the knee were obtained and revealed excellent position of his left total knee replacement.

## 2020-11-11 NOTE — HISTORY OF PRESENT ILLNESS
[de-identified] : 89 year old male patient returns for the 1st postoperative visit, 2 weeks s/p left TKR done on 10/27/2020. The patient is recovering at home. He reports mild postoperative pain. He is taking Tylenol. He is also taking 2.5mg of oxycodone as needed. He is receiving postoperative home PT. He is thrilled with his progress and result. The patient presents ambulating with a walker. He is on aspirin for blood thinning. Pmhx: He is 8.5 months s/p right TKR done on 2/25/2020 and is doing well.\par \par Radiology Results done 10/21/2020 revealed:\par o Right Knee : Standing AP, Lateral and skyline views of the knee were obtained and revealed excellent position of the total knee replacement with central tracking of the patella.

## 2020-11-11 NOTE — DISCUSSION/SUMMARY
[de-identified] : I discussed the underlying pathophysiology of the patient's condition in great detail with the patient. I went over the patient's x-rays with them in great detail. We discussed the use of ice, Tylenol and anti-inflammatories to relieve pain. I encouraged him to d/c the oxycodone. At-home strengthening and stretching exercises were discussed and demonstrated with the patient. We went over the wide ranging benefits of exercise for the patient health and I encouraged him to begin a diligent exercise program. He should focus on light weight and high repetition exercises. He should avoid squatting and kneeling. I showed the patient how to massage his scar with hand cream in order to desensitize the area, and advised him to do it daily. At this time, he will start a course of outpatient physical therapy for strengthening and flexibility. A prescription for physical therapy was provided. All of his questions were answered. He understands and consents to the plan. This treatment plan was discussed and reviewed with the patient's daughter who agrees with the treatment course.\par \par FU 1 month.\par after undergoing outpatient PT for his left knee.

## 2020-11-11 NOTE — REASON FOR VISIT
[Post Operative Visit] : a post operative visit for [Aftercare Following Surgery] : aftercare following surgery [FreeTextEntry2] : s/p left TKR done on 10/27/2020

## 2020-11-11 NOTE — ADDENDUM
[FreeTextEntry1] : I, Deon Miranda, acted solely as a scribe for Dr. Ronald Lomeli on this date 11/11/2020.\par All medical record entries made by the Scribe were at my, Dr. Ronald Lomeli, direction and personally dictated by me on 11/11/2020. I have reviewed the chart and agree that the record accurately reflects my personal performance of the history, physical exam, assessment and plan. I have also personally directed, reviewed, and agreed with the chart.

## 2020-12-30 ENCOUNTER — APPOINTMENT (OUTPATIENT)
Dept: ORTHOPEDIC SURGERY | Facility: CLINIC | Age: 85
End: 2020-12-30
Payer: MEDICARE

## 2020-12-30 VITALS — WEIGHT: 142 LBS | BODY MASS INDEX: 26.13 KG/M2 | HEIGHT: 62 IN

## 2020-12-30 PROCEDURE — 73562 X-RAY EXAM OF KNEE 3: CPT | Mod: LT

## 2020-12-30 PROCEDURE — 99024 POSTOP FOLLOW-UP VISIT: CPT

## 2021-01-27 ENCOUNTER — APPOINTMENT (OUTPATIENT)
Dept: ORTHOPEDIC SURGERY | Facility: CLINIC | Age: 86
End: 2021-01-27
Payer: MEDICARE

## 2021-01-27 PROCEDURE — 73562 X-RAY EXAM OF KNEE 3: CPT | Mod: 50

## 2021-01-27 PROCEDURE — 99213 OFFICE O/P EST LOW 20 MIN: CPT

## 2021-01-27 PROCEDURE — 99072 ADDL SUPL MATRL&STAF TM PHE: CPT

## 2021-02-04 ENCOUNTER — RX RENEWAL (OUTPATIENT)
Age: 86
End: 2021-02-04

## 2021-02-10 ENCOUNTER — APPOINTMENT (OUTPATIENT)
Dept: ORTHOPEDIC SURGERY | Facility: CLINIC | Age: 86
End: 2021-02-10

## 2021-04-29 ENCOUNTER — APPOINTMENT (OUTPATIENT)
Dept: INTERNAL MEDICINE | Facility: CLINIC | Age: 86
End: 2021-04-29

## 2021-05-10 ENCOUNTER — APPOINTMENT (OUTPATIENT)
Dept: ORTHOPEDIC SURGERY | Facility: CLINIC | Age: 86
End: 2021-05-10
Payer: MEDICARE

## 2021-05-10 PROCEDURE — 99213 OFFICE O/P EST LOW 20 MIN: CPT

## 2021-05-10 PROCEDURE — 73562 X-RAY EXAM OF KNEE 3: CPT | Mod: 50

## 2021-05-10 PROCEDURE — 99072 ADDL SUPL MATRL&STAF TM PHE: CPT

## 2021-05-10 NOTE — REASON FOR VISIT
[Follow-Up Visit] : a follow-up visit for [Spouse] : spouse [Other: _____] : [unfilled] [FreeTextEntry2] : s/p bilateral TKR

## 2021-05-10 NOTE — DISCUSSION/SUMMARY
[de-identified] : I discussed the underlying pathophysiology of the patient's condition in great detail with the patient. I went over the patient's x-rays with them in great detail. We discussed the use of ice, Tylenol and anti-inflammatories to relieve pain. At-home strengthening, and stretching exercises were discussed and demonstrated with the patient. He needs to avoid high-impact activities such as running and jumping. I recommend alternative activities such as riding a stationary bike on low tension, or the elliptical. He should focus on light weight and high repetition exercises. He should avoid squatting and kneeling. I showed the patient how to massage his scar with hand cream in order to desensitize the area, and advised him to do it daily. All of his questions were answered. He understands and consents to the plan. This treatment plan was discussed and reviewed with the patient's daughter and wife who agree with the treatment course. \par \par FU 3 months.\par after following a diligent HEP.

## 2021-05-10 NOTE — PHYSICAL EXAM
[de-identified] : Constitutional\par o Appearance : well-nourished, well developed, alert, in no acute distress \par Head and Face\par o Head :\par ¦ Inspection : atraumatic, normocephalic\par o Face :\par ¦ Inspection : no visible rash or discoloration\par Respiratory\par o Respiratory Effort: breathing unlabored \par Neurologic\par o Mental Status Examination :\par ¦ Orientation : alert and oriented X 3\par Psychiatric\par o Mood and Affect: mood normal, affect appropriate\par Cardiovascular\par o Observation/Palpation : - no swelling\par Lymphatic\par o Additional Nodes : No palpable lymph nodes present\par \par Right Lower Extremity\par o Buttock : no tenderness, swelling or deformities \par o Right Hip :\par ¦ Inspection/Palpation : no tenderness, swelling or deformities\par ¦ Range of Motion : full and painless in all planes, no crepitance\par ¦ Stability : joint stability intact\par ¦ Strength : extension, flexion, adduction, abduction, internal rotation and external rotation, 5/5\par \par o Right Knee :\par ¦ Inspection/Palpation : no tenderness, no swelling, well-healed incision\par ¦ Range of Motion : 0-120°, no crepitance, good patellofemoral glide\par ¦ Stability : no valgus or varus instability present on provocative testing\par ¦ Strength : flexion and extension 5/5\par ¦ Tests and Signs : negative Anterior Drawer\par \par Left Lower Extremity\par o Buttock : no tenderness, swelling or deformities \par o Left Hip :\par ¦ Inspection/Palpation : no tenderness, no swelling or deformities\par ¦ Range of Motion : full and painless in all planes, no crepitance\par ¦ Stability : joint stability intact\par ¦ Strength : extension, flexion, adduction, abduction, internal rotation and external rotation, 5/5\par \par o Left Knee :\par ¦ Inspection/Palpation : no tenderness to palpation, no swelling, well-healed incision\par ¦ Range of Motion : 0-120°, no crepitance, good patellofemoral glide \par ¦ Stability : no valgus or varus instability present on provocative testing\par ¦ Strength : flexion and extension 5/5\par ¦ Tests and Signs : negative Anterior Drawer\par \par o Peripheral Vascular System :\par ¦ Dorsalis Pedis Artery : pulse 2+ bilaterally\par ¦ Posterior Tibial Artery : pulse 2+ bilaterally \par \par Gait and Station:\par Gait: heel/toe gait, no significant extremity swelling or lymphedema, good proprioception and balance\par \par Radiology Results\par o Right Knee : Standing AP, Lateral and skyline views of the knee were obtained and revealed 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

## 2021-05-10 NOTE — ADDENDUM
[FreeTextEntry1] : I, Deon Miranda, acted solely as a scribe for Dr. Ronald Lomeli on this date 05/10/2021.\par All medical record entries made by the Scribe were at my, Dr. Ronald Lomeli, direction and personally dictated by me on 05/10/2021. I have reviewed the chart and agree that the record accurately reflects my personal performance of the history, physical exam, assessment and plan. I have also personally directed, reviewed, and agreed with the chart.

## 2021-05-10 NOTE — HISTORY OF PRESENT ILLNESS
[de-identified] : 89 year old male presents 6 months s/p left TKR done 10/27/2020. He is also over 1 year s/p right TKR done on 2/25/2020. He is no longer in PT. He has been exercising on his own every day, and has maintained his strength and ROM in both knees. He is thrilled with his progress and result. His only complaint is how his scar is healing on his left knee. Of note, He is fully vaccinated against COVID-19. \par \par Radiology Results taken on 1/27/2021:\par o Right Knee : Standing AP, Lateral and skyline views of the knee were obtained and revealed excellent position of the total knee replacement with central tracking of the patella.\par o Left Knee : Standing AP, Lateral and skyline views of the knee were obtained and revealed excellent position of the total knee replacement with central tracking of the patella.

## 2021-06-23 ENCOUNTER — APPOINTMENT (OUTPATIENT)
Dept: INTERNAL MEDICINE | Facility: CLINIC | Age: 86
End: 2021-06-23
Payer: MEDICARE

## 2021-06-23 VITALS
HEART RATE: 67 BPM | SYSTOLIC BLOOD PRESSURE: 127 MMHG | RESPIRATION RATE: 14 BRPM | DIASTOLIC BLOOD PRESSURE: 68 MMHG | OXYGEN SATURATION: 96 % | BODY MASS INDEX: 26.5 KG/M2 | WEIGHT: 144 LBS | TEMPERATURE: 97.5 F | HEIGHT: 62 IN

## 2021-06-23 PROCEDURE — G0439: CPT

## 2021-06-23 PROCEDURE — 36415 COLL VENOUS BLD VENIPUNCTURE: CPT

## 2021-06-23 PROCEDURE — 99072 ADDL SUPL MATRL&STAF TM PHE: CPT

## 2021-06-24 NOTE — REVIEW OF SYSTEMS
[Fever] : no fever [Night Sweats] : no night sweats [Discharge] : no discharge [Vision Problems] : no vision problems [Earache] : no earache [Nasal Discharge] : no nasal discharge [Chest Pain] : no chest pain [Palpitations] : no palpitations [Shortness Of Breath] : no shortness of breath [Wheezing] : no wheezing [Nausea] : no nausea [Vomiting] : no vomiting [Dysuria] : no dysuria [Hematuria] : no hematuria

## 2021-06-24 NOTE — HISTORY OF PRESENT ILLNESS
[de-identified] : Gained a few lbs.  No illness during the pandemic.  No nausea, vomiting, diarrhea, and constipation. No chest pain or shortness of breath.\par

## 2021-06-24 NOTE — HEALTH RISK ASSESSMENT
[Good] : ~his/her~  mood as  good [No] : No [No falls in past year] : Patient reported no falls in the past year [0] : 2) Feeling down, depressed, or hopeless: Not at all (0) [] : No [[id number]] : 0

## 2021-06-30 LAB
ALBUMIN SERPL ELPH-MCNC: 4 G/DL
ALP BLD-CCNC: 73 U/L
ALT SERPL-CCNC: 16 U/L
ANION GAP SERPL CALC-SCNC: 10 MMOL/L
AST SERPL-CCNC: 19 U/L
BASOPHILS # BLD AUTO: 0.16 K/UL
BASOPHILS NFR BLD AUTO: 1.8 %
BILIRUB SERPL-MCNC: 0.3 MG/DL
BUN SERPL-MCNC: 26 MG/DL
CALCIUM SERPL-MCNC: 9.1 MG/DL
CHLORIDE SERPL-SCNC: 106 MMOL/L
CHOLEST SERPL-MCNC: 159 MG/DL
CO2 SERPL-SCNC: 24 MMOL/L
CREAT SERPL-MCNC: 1.39 MG/DL
EOSINOPHIL # BLD AUTO: 0.28 K/UL
EOSINOPHIL NFR BLD AUTO: 3.2 %
GLUCOSE SERPL-MCNC: 97 MG/DL
HCT VFR BLD CALC: 40.4 %
HDLC SERPL-MCNC: 32 MG/DL
HGB BLD-MCNC: 14.6 G/DL
IMM GRANULOCYTES NFR BLD AUTO: 0.2 %
LDLC SERPL CALC-MCNC: 48 MG/DL
LYMPHOCYTES # BLD AUTO: 1.97 K/UL
LYMPHOCYTES NFR BLD AUTO: 22.6 %
MAN DIFF?: NORMAL
MCHC RBC-ENTMCNC: 36.1 GM/DL
MCHC RBC-ENTMCNC: 39.9 PG
MCV RBC AUTO: 110.4 FL
MONOCYTES # BLD AUTO: 0.97 K/UL
MONOCYTES NFR BLD AUTO: 11.1 %
NEUTROPHILS # BLD AUTO: 5.33 K/UL
NEUTROPHILS NFR BLD AUTO: 61.1 %
NONHDLC SERPL-MCNC: 127 MG/DL
PLATELET # BLD AUTO: 309 K/UL
POTASSIUM SERPL-SCNC: 5 MMOL/L
PROT SERPL-MCNC: 7 G/DL
RBC # BLD: 3.66 M/UL
RBC # FLD: 13.2 %
SODIUM SERPL-SCNC: 141 MMOL/L
T4 FREE SERPL-MCNC: 1.1 NG/DL
TRIGL SERPL-MCNC: 395 MG/DL
TSH SERPL-ACNC: 2.23 UIU/ML
WBC # FLD AUTO: 8.73 K/UL

## 2021-07-26 ENCOUNTER — RX RENEWAL (OUTPATIENT)
Age: 86
End: 2021-07-26

## 2021-08-18 ENCOUNTER — APPOINTMENT (OUTPATIENT)
Dept: ORTHOPEDIC SURGERY | Facility: CLINIC | Age: 86
End: 2021-08-18
Payer: MEDICARE

## 2021-08-18 PROCEDURE — 73562 X-RAY EXAM OF KNEE 3: CPT | Mod: LT

## 2021-08-18 PROCEDURE — 99213 OFFICE O/P EST LOW 20 MIN: CPT

## 2021-08-18 NOTE — HISTORY OF PRESENT ILLNESS
[de-identified] : 89 year old male presents almost 10 months s/p left TKR done 10/27/20. He is also s/p right TKR done 2/25/20. He is no longer in PT. He exercises on his own every day and has maintained his strength and ROM in both knees. He is thrilled with his result bilaterally. His daughter notes he has been walking a lot more. His only complaint is how his scar is healing on his left knee. Pmhx: He is fully COVID-19 vaccinated.\par \par Xrays done 5/10/21:\par o Right Knee : Standing AP, Lateral and skyline views of the knee were obtained and revealed 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

## 2021-08-18 NOTE — PHYSICAL EXAM
[de-identified] : Constitutional\par o Appearance : well-nourished, well developed, alert, in no acute distress \par Head and Face\par o Head :\par ¦ Inspection : atraumatic, normocephalic\par o Face :\par ¦ Inspection : no visible rash or discoloration\par Respiratory\par o Respiratory Effort: breathing unlabored \par Neurologic\par o Mental Status Examination :\par ¦ Orientation : alert and oriented X 3\par Psychiatric\par o Mood and Affect: mood normal, affect appropriate\par Cardiovascular\par o Observation/Palpation : - no swelling\par Lymphatic\par o Additional Nodes : No palpable lymph nodes present\par \par Right Lower Extremity\par o Buttock : no tenderness, swelling or deformities \par o Right Hip :\par ¦ Inspection/Palpation : no tenderness, swelling or deformities\par ¦ Range of Motion : full and painless in all planes, no crepitance\par ¦ Stability : joint stability intact\par ¦ Strength : extension, flexion, adduction, abduction, internal rotation and external rotation, 5/5\par \par o Right Knee :\par ¦ Inspection/Palpation : no tenderness, no swelling, well-healed incision\par ¦ Range of Motion : 0-125°, no crepitance, good patellofemoral glide\par ¦ Stability : no valgus or varus instability present on provocative testing\par ¦ Strength : flexion and extension 5/5\par ¦ Tests and Signs : negative Anterior Drawer\par \par Left Lower Extremity\par o Buttock : no tenderness, swelling or deformities \par o Left Hip :\par ¦ Inspection/Palpation : no tenderness, no swelling or deformities\par ¦ Range of Motion : full and painless in all planes, no crepitance\par ¦ Stability : joint stability intact\par ¦ Strength : extension, flexion, adduction, abduction, internal rotation and external rotation, 5/5\par \par o Left Knee :\par ¦ Inspection/Palpation : no tenderness to palpation, no swelling, well-healed incision\par ¦ Range of Motion : 0-125°, no crepitance, good patellofemoral glide \par ¦ Stability : no valgus or varus instability present on provocative testing\par ¦ Strength : flexion and extension 5/5\par ¦ Tests and Signs : negative Anterior Drawer\par \par o Peripheral Vascular System :\par ¦ Dorsalis Pedis Artery : pulse 2+ bilaterally\par ¦ Posterior Tibial Artery : pulse 2+ bilaterally \par \par Gait and Station:\par Gait: heel/toe gait, no significant extremity swelling or lymphedema, good proprioception and balance\par \par Radiology Results\par o Left Knee : Standing AP, Lateral and skyline views of the knee were obtained and revealed excellent position of his left total knee replacement with central tracking of the patella.

## 2021-08-18 NOTE — DISCUSSION/SUMMARY
[de-identified] : I discussed the underlying pathophysiology of the patient's condition in great detail with the patient, his wife and his daughter. I went over the patient's x-rays with them in great detail. He will continue with his home exercises. All of their questions were answered. They understand and consent to the plan. \par \par FU 3 months.

## 2021-08-18 NOTE — ADDENDUM
[FreeTextEntry1] : I, Deon Miranda, acted solely as a scribe for Dr. Ronald Lomeli on this date 08/18/2021.\par All medical record entries made by the Scribe were at my, Dr. Ronald Lomeli, direction and personally dictated by me on 08/18/2021. I have reviewed the chart and agree that the record accurately reflects my personal performance of the history, physical exam, assessment and plan. I have also personally directed, reviewed, and agreed with the chart.

## 2021-08-22 ENCOUNTER — RX RENEWAL (OUTPATIENT)
Age: 86
End: 2021-08-22

## 2021-11-22 ENCOUNTER — APPOINTMENT (OUTPATIENT)
Dept: ORTHOPEDIC SURGERY | Facility: CLINIC | Age: 86
End: 2021-11-22

## 2022-03-03 NOTE — PHYSICAL THERAPY INITIAL EVALUATION ADULT - FOLLOWS COMMANDS/ANSWERS QUESTIONS, REHAB EVAL
----- Message -----  From: Aniya Keen  Sent: 3/3/2022  10:22 AM CST  To: DAVID Nunez Nurse Msg Pool     Per fax from [facility] cpt code 64258 - 3 sessions per leg. Total of 6 sessions has been approved.  Effective 02/25/2022- 07/16/2022                   .  Type Date User Summary Attachment   Referral Comments 03/03/2022 10:22 AM Aniya Keen - -   Note    Per fax from [facility] cpt code 33748 - 3 sessions per leg. Total of 6 sessions has been approved.  Effective 02/25/2022- 07/16/2022                          .  Type Date User Summary Attachment   Referral Comments 02/26/2022  9:49 AM Aniya Keen Per [employer] [id number] no pre certification needed but recommend review.  -   Note         Per [employer] [id number] no pre certification needed but recommend review.   Reference number [id number]  Clinicals uploaded.                   .  Type Date User Summary Attachment   Provider Comments 02/25/2022  2:34 PM Aura Devine MA Provider Comments -   Note    Sclerotherapy 19142 - 3 sessions per leg. Total of 6 sessions                 100% of the time

## 2022-03-28 ENCOUNTER — APPOINTMENT (OUTPATIENT)
Dept: ORTHOPEDIC SURGERY | Facility: CLINIC | Age: 87
End: 2022-03-28
Payer: MEDICARE

## 2022-03-28 PROCEDURE — 99213 OFFICE O/P EST LOW 20 MIN: CPT

## 2022-03-28 PROCEDURE — 73562 X-RAY EXAM OF KNEE 3: CPT | Mod: 50

## 2022-03-28 NOTE — HISTORY OF PRESENT ILLNESS
[de-identified] : 89 year old male presents s/p bilateral TKR, right done 2/25/20 and left done 10/27/20. He is not in PT. He exercises on his own every day and has maintained his strength and ROM in both knees. He is thrilled with his progress and result. He notes no swelling or buckling. His only complaint is how the scar healed on the left knee. His daughter says he walks a lot.

## 2022-03-28 NOTE — ADDENDUM
[FreeTextEntry1] : I, Deon Miranda, acted solely as a scribe for Dr. Ronald Lomeli on this date 03/28/2022.\par All medical record entries made by the Scribe were at my, Dr. Ronald Lomeli, direction and personally dictated by me on 03/28/2022. I have reviewed the chart and agree that the record accurately reflects my personal performance of the history, physical exam, assessment and plan. I have also personally directed, reviewed, and agreed with the chart.

## 2022-03-28 NOTE — PHYSICAL EXAM
[de-identified] : Constitutional\par o Appearance : well-nourished, well developed, alert, in no acute distress \par Head and Face\par o Head :\par ¦ Inspection : atraumatic, normocephalic\par o Face :\par ¦ Inspection : no visible rash or discoloration\par Respiratory\par o Respiratory Effort: breathing unlabored \par Neurologic\par o Mental Status Examination :\par ¦ Orientation : alert and oriented X 3\par Psychiatric\par o Mood and Affect: mood normal, affect appropriate\par Cardiovascular\par o Observation/Palpation : - no swelling\par Lymphatic\par o Additional Nodes : No palpable lymph nodes present\par \par Right Lower Extremity\par o Buttock : no tenderness, swelling or deformities \par o Right Hip :\par ¦ Inspection/Palpation : no tenderness, swelling or deformities\par ¦ Range of Motion : full and painless in all planes, no crepitance\par ¦ Stability : joint stability intact\par ¦ Strength : extension, flexion, adduction, abduction, internal rotation and external rotation, 5/5\par \par o Right Knee :\par ¦ Inspection/Palpation : no tenderness, no swelling, well-healed incision\par ¦ Range of Motion : 0-120°, no crepitance, good patellofemoral glide\par ¦ Stability : no valgus or varus instability present on provocative testing\par ¦ Strength : flexion and extension 5/5\par ¦ Tests and Signs : negative Anterior Drawer\par \par Left Lower Extremity\par o Buttock : no tenderness, swelling or deformities \par o Left Hip :\par ¦ Inspection/Palpation : no tenderness, no swelling or deformities\par ¦ Range of Motion : full and painless in all planes, no crepitance\par ¦ Stability : joint stability intact\par ¦ Strength : extension, flexion, adduction, abduction, internal rotation and external rotation, 5/5\par \par o Left Knee :\par ¦ Inspection/Palpation : no tenderness to palpation, no swelling, well-healed incision with darkening in the proximal third of the incision\par ¦ Range of Motion : 0-120°, no crepitance, good patellofemoral glide \par ¦ Stability : no valgus or varus instability present on provocative testing\par ¦ Strength : flexion and extension 5/5\par ¦ Tests and Signs : minimally positive Anterior Drawer\par \par o Peripheral Vascular System :\par ¦ Dorsalis Pedis Artery : pulse 2+ bilaterally\par ¦ Posterior Tibial Artery : pulse 2+ bilaterally \par \par Gait and Station:\par Gait: heel/toe gait, no significant extremity swelling or lymphedema, good proprioception and balance\par \par Radiology Results\par o Right Knee : Standing AP, Lateral and skyline views of the knee were obtained and revealed 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

## 2022-03-28 NOTE — DISCUSSION/SUMMARY
[de-identified] : I discussed the underlying pathophysiology of the patient's condition in great detail with the patient and his daughter. I went over the patient's x-rays with them in great detail. I advised him to massage his scar with hand cream daily. We discussed the use of ice to relieve pain. I informed them he no longer needs prophylactic dental prophylaxis when undergoing any dental work. He now needs to follow-up on a yearly basis. All of their questions were answered. They understand and consent to the plan. \par \par FU 1 year.

## 2022-05-05 ENCOUNTER — NON-APPOINTMENT (OUTPATIENT)
Age: 87
End: 2022-05-05

## 2022-06-06 ENCOUNTER — RX RENEWAL (OUTPATIENT)
Age: 87
End: 2022-06-06

## 2022-08-07 NOTE — PATIENT PROFILE ADULT - ARE SIGNIFICANT INDICATORS COMPLETE.
No Use afrin spray twice daily for three days.  Use normal saline spray as much as needed for congestion.  Follow up with Dr. Stewart this week by calling [phone] or [phone].    Return to ED with any worsening symptoms or other concerns.          Hemorragia nasal    LO QUE NECESITA SABER:    ¿Qué necesito saber acerca de ramiro hemorragia nasal?Ramiro hemorragia nasal o epistaxis ocurre cuando cristal o más de los vasos sanguíneos de marcus nariz se revientan. Usted podría tener sangrado canseco oscuro o brillante en ramiro o ambas fosas nasales. Ramiro hemorragia nasal puede ser provocada por cualquiera de lo siguiente:   •Frío, aire seco      •Trauma por hurgarse marcus nariz o un golpe directo a marcus nariz      •Estructura anormal de la nariz, cat un tabique desviado      •Irritación o inflamación debido a un resfriado, ramiro infección respiratoria o a alergias      •Un objeto atorado en marcus nariz      •Ciertos medicamentos, cat los anticoagulantes      ¿Cómo se diagnostica ramiro hemorragia nasal?  •Un examen nasalpodría mostrar coágulos de matt o inflamación. Marcus médico usará un instrumento que se conoce cat espéculo para revisar el interior de marcus nariz. [place] abre alexandru fosas nasales cuidadosamente para que marcus médico pueda kandis qué parte de marcus nariz está sangrando.      •Ramiro endoscopia nasales un examen más profundo del interior de marcus nariz. Marcus médico usa un endoscopio (tubo perales y flexible con ramiro shawanda y cámara en el extremo) para kandis más dentro de marcus nariz.      ¿Cuáles son los primeros auxilios que debería realizar para ramiro hemorragia nasal?  •Siéntese derecho e inclínese hacia adelante.[place] ayudará a evitar que usted se trague la matt. Escupa la matt y saliva en un contenedor.      •Aplique presión en marcus nariz.Use 2 dedos para apretar y cerrar marcus nariz por 10 a 15 minutos. [place] ayudará a detener el sangrado.      •Aplique hielosobre el smith nasal para disminuir la inflamación y el sangrado. Use un paquete con hielo o ponga hielo triturado en ramiro bolsa de plástico. Cúbrala con ramiro toalla para proteger marcus piel.      •Tape marcus narizcon un copo de algodón, pañuelos desechables, tampón o un vendaje de gaza para detener el sangrado.      ¿Cómo se trata ramiro hemorragia nasal severa?Usted podría necesitar alguno de los siguientes si la hemorragia no se detiene después de erasmo realizado los primeros auxilios:  •Los medicamentospodrían aplicarse a un pedazo pequeño de algodón y colocarse en marcus nariz. Los medicamentos también podrían ser rociados o aplicados directamente en marcus nariz. Es posible que usted necesite medicamento para evitar ramiro infección. Si la hemorragia es severa, se le podría inyectar el medicamento en un vaso sanguíneo de marcus nariz.      •La cauterizaciónes cuando se usa un químico o un dispositivo eléctrico para sellar los vasos sanguíneos. [place] podría realizarse para detener la hemorragia o evitar más sangrado. Podría usarse anestesia local.      •El taponamiento nasales cuando se colocan capas de gaza en marcus nariz para proporcionar presión y detener el sangrado. Podría usarse anestesia local. El taponamiento nasal usualmente se remueve dentro de 2 a 3 días.      •La embolizaciónes un procedimiento que se usa para detener el sangrado del interior de marcus nariz. Podría usarse pegamento médico o un dispositivo con balón pequeño para obstruir los vasos sanguíneos en marcus nariz.      •La cirugíapodría ser necesaria si marcus hemorragia nasal regresa ramiro y otra vez por mucho tiempo. Podrían atar ramiro arteria para detener el sangrado. Podrían reparar el tejido dañado o ramiro estructura anormal en marcus nariz.      ¿Cómo puedo ayudar a evitar otra hemorragia nasal?  •Mantenga marcus nariz húmeda.Ponga ramiro pequeña cantidad de ramiro pomada de petrolato adentro de alexandru fosas nasales según sea necesario. Use ramiro spray nasal salino (agua con sal). No ponga nada más dentro de marcus nariz a menos que marcus médico lo autorice. No use un lubricante a base de aceite si está teniendo terapia de oxígeno. Podrían ser inflamables.      •Éstos podrían ser inflamables.Use un humidificador de vapor frío en marcus hogar.[place] ayudará a que marcus nariz esté húmeda.      •No se hurgue ni se suene la nariz por lo menos por ramiro semana.Usted puede irritar o dañar marcus nariz si se la hurga. Al sonar o soplar la nariz muy shanique podría provocar que comience a sangrar de nuevo. No se agache o se esfuerce porque esto puede provocarle que matt de nuevo.      •Evite los irritantescomo el humo del cigarro o atomizadores de químicos cat los limpiadores.      ¿Cuándo jazmin buscar atención inmediata?  •Marcus nariz todavía sangra después de 20 minutos, aún después de aplicarle presión.      •Marcus tapón nasal está empapado de matt.      •A usted le sale ramiro secreción de mal olor de marcus nariz.      •Usted se siente tan débil y mareado que tiene dificultad para ponerse de pie.      •Usted tiene dificultad para respirar o hablar.      ¿Cuándo jazmin comunicarme con mi médico?  •Usted tiene fiebre y está vomitando.      •Usted tiene dolor en y alrededor de la nariz.      •El tapón nasal está suelto.      •Usted tiene preguntas o inquietudes acerca de marcus condición o cuidado.      ACUERDOS SOBRE MARCUS CUIDADO:    Usted tiene el derecho de ayudar a planear marcus cuidado. Aprenda todo lo que pueda sobre marcus condición y cat darle tratamiento. Discuta alexandru opciones de tratamiento con alexandru médicos para decidir el cuidado que usted desea recibir. Usted siempre tiene el derecho de rechazar el tratamiento.       © Copyright [employer] 2022           back to top                          © Copyright [employer] 2022 Yes

## 2022-08-09 ENCOUNTER — APPOINTMENT (OUTPATIENT)
Dept: INTERNAL MEDICINE | Facility: CLINIC | Age: 87
End: 2022-08-09

## 2022-08-09 ENCOUNTER — NON-APPOINTMENT (OUTPATIENT)
Age: 87
End: 2022-08-09

## 2022-08-09 VITALS
TEMPERATURE: 98.4 F | SYSTOLIC BLOOD PRESSURE: 116 MMHG | DIASTOLIC BLOOD PRESSURE: 69 MMHG | BODY MASS INDEX: 24.66 KG/M2 | OXYGEN SATURATION: 97 % | HEART RATE: 65 BPM | WEIGHT: 134 LBS | HEIGHT: 62 IN

## 2022-08-09 DIAGNOSIS — Z00.00 ENCOUNTER FOR GENERAL ADULT MEDICAL EXAMINATION W/OUT ABNORMAL FINDINGS: ICD-10-CM

## 2022-08-09 PROCEDURE — 36415 COLL VENOUS BLD VENIPUNCTURE: CPT

## 2022-08-09 PROCEDURE — G0439: CPT

## 2022-08-09 PROCEDURE — 93000 ELECTROCARDIOGRAM COMPLETE: CPT

## 2022-08-09 NOTE — REVIEW OF SYSTEMS
[Fever] : no fever [Night Sweats] : no night sweats [Discharge] : no discharge [Pain] : no pain [Earache] : no earache [Hearing Loss] : no hearing loss [Chest Pain] : no chest pain [Palpitations] : no palpitations [Shortness Of Breath] : no shortness of breath [Cough] : no cough [Nausea] : no nausea [Vomiting] : no vomiting [Dysuria] : no dysuria [Hematuria] : no hematuria [Headache] : no headache [Dizziness] : no dizziness [Easy Bleeding] : no easy bleeding [Easy Bruising] : no easy bruising

## 2022-08-09 NOTE — HEALTH RISK ASSESSMENT
[Good] : ~his/her~  mood as  good [Never] : Never [No falls in past year] : Patient reported no falls in the past year [0] : 2) Feeling down, depressed, or hopeless: Not at all (0) [PHQ-2 Negative - No further assessment needed] : PHQ-2 Negative - No further assessment needed [None] : None [With Significant Other] : lives with significant other [Retired] : retired [] :  [Fully functional (bathing, dressing, toileting, transferring, walking, feeding)] : Fully functional (bathing, dressing, toileting, transferring, walking, feeding) [Fully functional (using the telephone, shopping, preparing meals, housekeeping, doing laundry, using] : Fully functional and needs no help or supervision to perform IADLs (using the telephone, shopping, preparing meals, housekeeping, doing laundry, using transportation, managing medications and managing finances) [[id number]] : 0 [Change in mental status noted] : No change in mental status noted

## 2022-08-09 NOTE — HISTORY OF PRESENT ILLNESS
[de-identified] : Lost some weight.  Cut back on portions.  No nausea, vomiting, diarrhea, and constipation. No chest pain or shortness of breath.\par

## 2022-08-09 NOTE — PHYSICAL EXAM
[No Acute Distress] : no acute distress [Well Nourished] : well nourished [Normal Sclera/Conjunctiva] : normal sclera/conjunctiva [EOMI] : extraocular movements intact [Normal Outer Ear/Nose] : the outer ears and nose were normal in appearance [Normal TMs] : both tympanic membranes were normal [No Lymphadenopathy] : no lymphadenopathy [Supple] : supple [No Respiratory Distress] : no respiratory distress  [Clear to Auscultation] : lungs were clear to auscultation bilaterally [Regular Rhythm] : with a regular rhythm [No Murmur] : no murmur heard [Soft] : abdomen soft [Non Tender] : non-tender [Coordination Grossly Intact] : coordination grossly intact [Normal Gait] : normal gait [Speech Grossly Normal] : speech grossly normal [Normal Mood] : the mood was normal

## 2022-08-18 LAB
ALBUMIN SERPL ELPH-MCNC: 4.4 G/DL
ALP BLD-CCNC: 72 U/L
ALT SERPL-CCNC: 13 U/L
ANION GAP SERPL CALC-SCNC: 11 MMOL/L
AST SERPL-CCNC: 17 U/L
BASOPHILS # BLD AUTO: 0.15 K/UL
BASOPHILS NFR BLD AUTO: 1.8 %
BILIRUB SERPL-MCNC: 0.6 MG/DL
BUN SERPL-MCNC: 28 MG/DL
CALCIUM SERPL-MCNC: 9.6 MG/DL
CHLORIDE SERPL-SCNC: 104 MMOL/L
CHOLEST SERPL-MCNC: 134 MG/DL
CO2 SERPL-SCNC: 24 MMOL/L
CREAT SERPL-MCNC: 1.32 MG/DL
EGFR: 50 ML/MIN/1.73M2
EOSINOPHIL # BLD AUTO: 0.27 K/UL
EOSINOPHIL NFR BLD AUTO: 3.3 %
FOLATE SERPL-MCNC: >20 NG/ML
GLUCOSE SERPL-MCNC: 96 MG/DL
HCT VFR BLD CALC: 42.8 %
HDLC SERPL-MCNC: 36 MG/DL
HGB BLD-MCNC: 14.9 G/DL
IMM GRANULOCYTES NFR BLD AUTO: 0.1 %
LDLC SERPL CALC-MCNC: 69 MG/DL
LYMPHOCYTES # BLD AUTO: 1.88 K/UL
LYMPHOCYTES NFR BLD AUTO: 23.1 %
MAN DIFF?: NORMAL
MCHC RBC-ENTMCNC: 34.8 GM/DL
MCHC RBC-ENTMCNC: 38 PG
MCV RBC AUTO: 109.2 FL
MONOCYTES # BLD AUTO: 0.75 K/UL
MONOCYTES NFR BLD AUTO: 9.2 %
NEUTROPHILS # BLD AUTO: 5.08 K/UL
NEUTROPHILS NFR BLD AUTO: 62.5 %
NONHDLC SERPL-MCNC: 98 MG/DL
PLATELET # BLD AUTO: 287 K/UL
POTASSIUM SERPL-SCNC: 5.1 MMOL/L
PROT SERPL-MCNC: 7.2 G/DL
RBC # BLD: 3.92 M/UL
RBC # FLD: 14 %
SODIUM SERPL-SCNC: 139 MMOL/L
T4 FREE SERPL-MCNC: 1.2 NG/DL
TRIGL SERPL-MCNC: 146 MG/DL
TSH SERPL-ACNC: 4.27 UIU/ML
VIT B12 SERPL-MCNC: 859 PG/ML
WBC # FLD AUTO: 8.14 K/UL

## 2022-08-29 ENCOUNTER — RX RENEWAL (OUTPATIENT)
Age: 87
End: 2022-08-29

## 2022-09-26 NOTE — COUNSELING
[Activity counseling provided] : activity [Healthy eating counseling provided] : healthy eating [ - Annual Depression Screening] : Annual Depression Screening Eye Protection Verbiage: Before proceeding with the stage, a plastic scleral shield was inserted. The globe was anesthetized with a few drops of 1% lidocaine with 1:100,000 epinephrine. Then, an appropriate sized scleral shield was chosen and coated with lacrilube ointment. The shield was gently inserted and left in place for the duration of each stage. After the stage was completed, the shield was gently removed.

## 2022-12-12 ENCOUNTER — RX RENEWAL (OUTPATIENT)
Age: 87
End: 2022-12-12

## 2022-12-20 ENCOUNTER — NON-APPOINTMENT (OUTPATIENT)
Age: 87
End: 2022-12-20

## 2022-12-20 ENCOUNTER — APPOINTMENT (OUTPATIENT)
Dept: OPHTHALMOLOGY | Facility: CLINIC | Age: 87
End: 2022-12-20

## 2022-12-20 PROCEDURE — 92134 CPTRZ OPH DX IMG PST SGM RTA: CPT

## 2022-12-20 PROCEDURE — 92136 OPHTHALMIC BIOMETRY: CPT

## 2022-12-20 PROCEDURE — 92004 COMPRE OPH EXAM NEW PT 1/>: CPT

## 2022-12-20 PROCEDURE — 92286 ANT SGM IMG I&R SPECLR MIC: CPT

## 2023-02-16 ENCOUNTER — NON-APPOINTMENT (OUTPATIENT)
Age: 88
End: 2023-02-16

## 2023-02-16 ENCOUNTER — APPOINTMENT (OUTPATIENT)
Dept: INTERNAL MEDICINE | Facility: CLINIC | Age: 88
End: 2023-02-16
Payer: MEDICARE

## 2023-02-16 ENCOUNTER — LABORATORY RESULT (OUTPATIENT)
Age: 88
End: 2023-02-16

## 2023-02-16 VITALS
OXYGEN SATURATION: 97 % | SYSTOLIC BLOOD PRESSURE: 149 MMHG | DIASTOLIC BLOOD PRESSURE: 85 MMHG | HEIGHT: 62 IN | BODY MASS INDEX: 23.92 KG/M2 | HEART RATE: 64 BPM | WEIGHT: 130 LBS | TEMPERATURE: 97.6 F

## 2023-02-16 DIAGNOSIS — Z11.59 ENCOUNTER FOR SCREENING FOR OTHER VIRAL DISEASES: ICD-10-CM

## 2023-02-16 PROCEDURE — 93000 ELECTROCARDIOGRAM COMPLETE: CPT

## 2023-02-16 PROCEDURE — 36415 COLL VENOUS BLD VENIPUNCTURE: CPT

## 2023-02-16 PROCEDURE — 99214 OFFICE O/P EST MOD 30 MIN: CPT | Mod: 25

## 2023-02-16 RX ORDER — PANTOPRAZOLE 20 MG/1
20 TABLET, DELAYED RELEASE ORAL
Refills: 0 | Status: ACTIVE | COMMUNITY

## 2023-02-16 RX ORDER — AMOXICILLIN 500 MG/1
500 TABLET, FILM COATED ORAL
Qty: 4 | Refills: 4 | Status: DISCONTINUED | COMMUNITY
Start: 2021-04-28 | End: 2023-02-16

## 2023-02-16 NOTE — PHYSICAL EXAM
[No Acute Distress] : no acute distress [Well Nourished] : well nourished [Normal Sclera/Conjunctiva] : normal sclera/conjunctiva [Normal Oropharynx] : the oropharynx was normal [No JVD] : no jugular venous distention [Supple] : supple [No Accessory Muscle Use] : no accessory muscle use [Clear to Auscultation] : lungs were clear to auscultation bilaterally [Normal Rate] : normal rate  [Normal S1, S2] : normal S1 and S2 [Soft] : abdomen soft [Non Tender] : non-tender [Coordination Grossly Intact] : coordination grossly intact [Normal Gait] : normal gait [Speech Grossly Normal] : speech grossly normal [Normal Mood] : the mood was normal

## 2023-02-19 LAB
ALBUMIN SERPL ELPH-MCNC: 4 G/DL
ALP BLD-CCNC: 83 U/L
ALT SERPL-CCNC: 18 U/L
ANION GAP SERPL CALC-SCNC: 12 MMOL/L
AST SERPL-CCNC: 17 U/L
BASOPHILS # BLD AUTO: 0.16 K/UL
BASOPHILS NFR BLD AUTO: 1.8 %
BILIRUB SERPL-MCNC: 0.4 MG/DL
BUN SERPL-MCNC: 26 MG/DL
CALCIUM SERPL-MCNC: 9.3 MG/DL
CHLORIDE SERPL-SCNC: 108 MMOL/L
CO2 SERPL-SCNC: 23 MMOL/L
CREAT SERPL-MCNC: 1.26 MG/DL
EGFR: 53 ML/MIN/1.73M2
EOSINOPHIL # BLD AUTO: 0.21 K/UL
EOSINOPHIL NFR BLD AUTO: 2.4 %
GLUCOSE SERPL-MCNC: 103 MG/DL
HCT VFR BLD CALC: 40.7 %
HGB BLD-MCNC: 14.2 G/DL
IMM GRANULOCYTES NFR BLD AUTO: 0.1 %
LYMPHOCYTES # BLD AUTO: 2.31 K/UL
LYMPHOCYTES NFR BLD AUTO: 26.6 %
MAN DIFF?: NORMAL
MCHC RBC-ENTMCNC: 34.9 GM/DL
MCHC RBC-ENTMCNC: 37.5 PG
MCV RBC AUTO: 107.4 FL
MONOCYTES # BLD AUTO: 0.71 K/UL
MONOCYTES NFR BLD AUTO: 8.2 %
NEUTROPHILS # BLD AUTO: 5.27 K/UL
NEUTROPHILS NFR BLD AUTO: 60.9 %
PLATELET # BLD AUTO: 281 K/UL
POTASSIUM SERPL-SCNC: 4.8 MMOL/L
PROT SERPL-MCNC: 7 G/DL
RBC # BLD: 3.79 M/UL
RBC # FLD: 13.6 %
SODIUM SERPL-SCNC: 142 MMOL/L
TSH SERPL-ACNC: 1.68 UIU/ML
WBC # FLD AUTO: 8.67 K/UL

## 2023-02-19 NOTE — HISTORY OF PRESENT ILLNESS
[No Pertinent Cardiac History] : no history of aortic stenosis, atrial fibrillation, coronary artery disease, recent myocardial infarction, or implantable device/pacemaker [No Pertinent Pulmonary History] : no history of asthma, COPD, sleep apnea, or smoking [No Adverse Anesthesia Reaction] : no adverse anesthesia reaction in self or family member [(Patient denies any chest pain, claudication, dyspnea on exertion, orthopnea, palpitations or syncope)] : Patient denies any chest pain, claudication, dyspnea on exertion, orthopnea, palpitations or syncope [Chronic Anticoagulation] : no chronic anticoagulation [Chronic Kidney Disease] : no chronic kidney disease [Diabetes] : no diabetes [FreeTextEntry1] : L eye cataract and partial Cornea [FreeTextEntry2] : 2/27/23 [FreeTextEntry3] : Dr Beal

## 2023-02-19 NOTE — REVIEW OF SYSTEMS
[Fever] : no fever [Night Sweats] : no night sweats [Chest Pain] : no chest pain [Palpitations] : no palpitations [Shortness Of Breath] : no shortness of breath [Cough] : no cough [Nausea] : no nausea [Vomiting] : no vomiting [Itching] : no itching [Skin Rash] : no skin rash [Headache] : no headache [Memory Loss] : no memory loss [Easy Bleeding] : no easy bleeding [Easy Bruising] : no easy bruising

## 2023-02-19 NOTE — ADDENDUM
[FreeTextEntry1] : labs WAL, EKG NSR no acute ST-T changes.\par Pt is medically optimized and cleared for eye Sx and anesthesia

## 2023-02-24 RX ORDER — DOCUSATE SODIUM 100 MG
2 CAPSULE ORAL
Qty: 0 | Refills: 0 | DISCHARGE

## 2023-02-24 RX ORDER — CETIRIZINE HYDROCHLORIDE 10 MG/1
1 TABLET ORAL
Qty: 0 | Refills: 0 | DISCHARGE

## 2023-02-24 RX ORDER — SIMVASTATIN 20 MG/1
1 TABLET, FILM COATED ORAL
Qty: 0 | Refills: 0 | DISCHARGE

## 2023-02-24 RX ORDER — DOCUSATE SODIUM 100 MG
1 CAPSULE ORAL
Qty: 0 | Refills: 0 | DISCHARGE

## 2023-02-24 NOTE — ASU PATIENT PROFILE, ADULT - FALL HARM RISK - UNIVERSAL INTERVENTIONS
Bed in lowest position, wheels locked, appropriate side rails in place/Call bell, personal items and telephone in reach/Instruct patient to call for assistance before getting out of bed or chair/Non-slip footwear when patient is out of bed/[place] to call system/Physically safe environment - no spills, clutter or unnecessary equipment/Purposeful Proactive Rounding/Room/bathroom lighting operational, light cord in reach No

## 2023-02-24 NOTE — ASU PATIENT PROFILE, ADULT - NSICDXPASTSURGICALHX_GEN_ALL_CORE_FT
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

## 2023-02-24 NOTE — ASU PATIENT PROFILE, ADULT - NSICDXPASTMEDICALHX_GEN_ALL_CORE_FT
PAST MEDICAL HISTORY:  Acid reflux     H/O: glaucoma     [place] (hard of hearing)     HTN (hypertension)     Hypercholesteremia     Hypothyroid

## 2023-02-27 ENCOUNTER — TRANSCRIPTION ENCOUNTER (OUTPATIENT)
Age: 88
End: 2023-02-27

## 2023-02-27 ENCOUNTER — APPOINTMENT (OUTPATIENT)
Dept: OPHTHALMOLOGY | Facility: AMBULATORY SURGERY CENTER | Age: 88
End: 2023-02-27

## 2023-02-27 ENCOUNTER — OUTPATIENT (OUTPATIENT)
Dept: OUTPATIENT SERVICES | Facility: HOSPITAL | Age: 88
LOS: 1 days | Discharge: HOME | End: 2023-02-27
Payer: MEDICARE

## 2023-02-27 VITALS
RESPIRATION RATE: 16 BRPM | TEMPERATURE: 98 F | OXYGEN SATURATION: 96 % | DIASTOLIC BLOOD PRESSURE: 50 MMHG | SYSTOLIC BLOOD PRESSURE: 122 MMHG | HEART RATE: 57 BPM

## 2023-02-27 VITALS
RESPIRATION RATE: 16 BRPM | WEIGHT: 125.66 LBS | DIASTOLIC BLOOD PRESSURE: 54 MMHG | HEIGHT: 63 IN | HEART RATE: 62 BPM | TEMPERATURE: 99 F | SYSTOLIC BLOOD PRESSURE: 137 MMHG | OXYGEN SATURATION: 98 %

## 2023-02-27 DIAGNOSIS — Z98.890 OTHER SPECIFIED POSTPROCEDURAL STATES: Chronic | ICD-10-CM

## 2023-02-27 DIAGNOSIS — Z96.652 PRESENCE OF LEFT ARTIFICIAL KNEE JOINT: Chronic | ICD-10-CM

## 2023-02-27 DIAGNOSIS — Z96.651 PRESENCE OF RIGHT ARTIFICIAL KNEE JOINT: Chronic | ICD-10-CM

## 2023-02-27 LAB
GRAM STN FLD: SIGNIFICANT CHANGE UP
SPECIMEN SOURCE: SIGNIFICANT CHANGE UP

## 2023-02-27 PROCEDURE — 66982 XCAPSL CTRC RMVL CPLX WO ECP: CPT | Mod: LT

## 2023-02-27 PROCEDURE — 88304 TISSUE EXAM BY PATHOLOGIST: CPT | Mod: 26

## 2023-02-27 PROCEDURE — 65756 CORNEAL TRNSPL ENDOTHELIAL: CPT | Mod: LT

## 2023-02-27 DEVICE — IMPLANTABLE DEVICE
Type: IMPLANTABLE DEVICE | Status: NON-FUNCTIONAL
Removed: 2023-02-27

## 2023-02-27 RX ORDER — SODIUM CHLORIDE 9 MG/ML
1000 INJECTION, SOLUTION INTRAVENOUS
Refills: 0 | Status: DISCONTINUED | OUTPATIENT
Start: 2023-02-27 | End: 2023-02-27

## 2023-02-27 RX ORDER — PHENYLEPHRINE HCL 2.5 %
1 DROPS OPHTHALMIC (EYE)
Refills: 0 | Status: COMPLETED | OUTPATIENT
Start: 2023-02-27 | End: 2023-02-27

## 2023-02-27 RX ORDER — OFLOXACIN 0.3 %
1 DROPS OPHTHALMIC (EYE)
Refills: 0 | Status: COMPLETED | OUTPATIENT
Start: 2023-02-27 | End: 2023-02-27

## 2023-02-27 RX ORDER — TROPICAMIDE 1 %
1 DROPS OPHTHALMIC (EYE)
Refills: 0 | Status: COMPLETED | OUTPATIENT
Start: 2023-02-27 | End: 2023-02-27

## 2023-02-27 RX ORDER — ACETAMINOPHEN 500 MG
1000 TABLET ORAL ONCE
Refills: 0 | Status: DISCONTINUED | OUTPATIENT
Start: 2023-02-27 | End: 2023-02-27

## 2023-02-27 RX ADMIN — Medication 1 DROP(S): at 10:48

## 2023-02-27 RX ADMIN — Medication 1 DROP(S): at 10:43

## 2023-02-27 RX ADMIN — Medication 1 DROP(S): at 10:57

## 2023-02-27 RX ADMIN — Medication 1 DROP(S): at 10:49

## 2023-02-27 RX ADMIN — Medication 1 DROP(S): at 10:58

## 2023-02-27 NOTE — OPERATIVE REPORT - OPERATIVE RPOSRT DETAILS
PREOPERATIVE DIAGNOSIS:  1. Cataract left eye  2. ______________ left eye    POSTOPERATIVE DIAGNOSIS: 1. Cataract left eye 2. _____________ left eye    OPERATIVE PROCEDURE:  1. Phacoemulsification/Intraocular lens implantation left eye 2. Descemet Stripping Automated Endothelial Keratoplasty left eye    IMPLANT: Donor #                             ,  Graft size     LENS USED:     LENS POWER:     PROCEDURE:  The patient was brought into the operating room and placed supine on the operating room table. After uneventful induction of neuroleptic anesthesia, a retrobulbar block consisting of 5-7 cc  of                         2% lidocaine and 0.75% marcaine was administered around the left eye. A modified van Lint style lid block was also given. The patient was then prepped and draped in the usual sterile fashion. A wire lid speculum was inserted into the operative eye giving a wide palpebral fissure. This procedure is going to be performed with an assistant surgeon whose extra hands are required to properly manage the surgery  A jamila knife was used to perform paracenteses through clear cornea at the temporal oblique meridians. Viscoelastic was then used to maintain the anterior chamber. A keratome was used to create a clear corneal incision just inside the temporal limbus. A prebent 25 gauge needle was then used to perform an anterior capsulorrhexis. Balanced salt solution was used to hydrodissect the nucleus. The phacoemulsification unit was then used to completely phacoemulsify the nucleus. Cortical cleanup was completed with the I/A handpiece. Viscoelastic was again used to reform the anterior chamber and capsular bag.  A new foldable posterior chamber intraocular lens was brought into the surgical field. It was folded and directly injected into the capsular bag where it was centered and secured.   An additional paracentesis was created for the infusion. An anterior chamber maintainer was placed intd the paracentesis and balanced salt solution was infused to maintain the anterior chamber. Using Castroviejo calipers it was determined that the aforementioned donor size would adequately cover the posterior corneal surface avoiding angle structures. Attention was then addressed to the donor cornea.  The donor was cut to the appropriate size using a punch block and trephine, and the lenticule was covered with storage medium and set aside for later use.  Attention was then readdressed to the recipient cornea. A reverse Sinsky hook was used to score Descemet's membrane at a diameter just smaller than the intended donor lenticle. A keratome was used to create a clear corneal incision just inside the temporal limbus. A Descemet's stripping instrument was used to remove Descemet's membrane from the scored area. The keratome was then used to widen the temporal corneal wound to approximately 4.5 mm. All viscoelastic was then aspirated from the anterior segment using an aspirating handpiece. The donor lenticule was then brought into the surgical field and was loaded into a lenticule  which was placed into the corneal wound. Endoforceps were then passed through the superonasal paracentesis across the anterior chamber to the temporal corneal wound grasping the donor lenticule and advancing it into the anterior chamber. The donor lenticule spontaneously unfurled endothelial side down.  The anterior chamber maintainer was removed and the paracentesis and corneal wound were closed with 10-0 nylon sutures. All the knots were then buried.  A 30 gauge cannula was placed on a 5 cc syringe filled with either air or 20% SF6 gas and then passed through a paracentesis under the donor lenticule filling the anterior chamber with air or gas to tamponade the lenticule against the inner surface of the cornea.  Topical irrigation of 4 mg dexamethasone and 100 mg cephazolin was administered inferiorly in the conjunctival cul de sac. The lid speculum was removed from the eye and a shield and dressing placed over the eye.  The patient tolerated the procedure well and was to remain in a supine position in the recovery area for approximately 45 minutes before discharge   PREOPERATIVE DIAGNOSIS:  1. Cataract left eye  2. Corneal Edema left eye    POSTOPERATIVE DIAGNOSIS: 1. Cataract left eye 2. Corneal Edemaleft eye    OPERATIVE PROCEDURE:  1. Phacoemulsification/Intraocular lens implantation left eye 2. Descemet Stripping Automated Endothelial Keratoplasty left eye    IMPLANT: Donor #   [id number]       Graft size 8mm    LENS USED: MI60L 26.5D       PROCEDURE:  The patient was brought into the operating room and placed supine on the operating room table. After uneventful induction of neuroleptic anesthesia, a retrobulbar block consisting of 5-7 cc  of                      2% lidocaine and 0.75% marcaine was administered around the left eye. A modified van Lint style lid block was also given. The patient was then prepped and draped in the usual sterile fashion. A wire lid speculum was inserted into the operative eye giving a wide palpebral fissure. This procedure is going to be performed with an assistant surgeon whose extra hands are required to properly manage the surgery  A jamila knife was used to perform paracenteses through clear cornea at the temporal oblique meridians. Viscoelastic was then used to maintain the anterior chamber. A keratome was used to create a clear corneal incision just inside the temporal limbus. A prebent 25 gauge needle was then used to perform an anterior capsulorrhexis. Balanced salt solution was used to hydrodissect the nucleus. The phacoemulsification unit was then used to completely phacoemulsify the nucleus. Cortical cleanup was completed with the I/A handpiece. Viscoelastic was again used to reform the anterior chamber and capsular bag.  A new foldable posterior chamber intraocular lens was brought into the surgical field. It was folded and directly injected into the capsular bag where it was centered and secured.   An additional paracentesis was created for the infusion. An anterior chamber maintainer was placed intd the paracentesis and balanced salt solution was infused to maintain the anterior chamber. Using Castroviejo calipers it was determined that the aforementioned donor size would adequately cover the posterior corneal surface avoiding angle structures. Attention was then addressed to the donor cornea.  The donor was cut to the appropriate size using a punch block and trephine, and the lenticule was covered with storage medium and set aside for later use.  Attention was then readdressed to the recipient cornea. A reverse Sinsky hook was used to score Descemet's membrane at a diameter just smaller than the intended donor lenticle. A keratome was used to create a clear corneal incision just inside the temporal limbus. A Descemet's stripping instrument was used to remove Descemet's membrane from the scored area. The keratome was then used to widen the temporal corneal wound to approximately 4.5 mm. All viscoelastic was then aspirated from the anterior segment using an aspirating handpiece. The donor lenticule was then brought into the surgical field and was loaded into a lenticule  which was placed into the corneal wound. Endoforceps were then passed through the superonasal paracentesis across the anterior chamber to the temporal corneal wound grasping the donor lenticule and advancing it into the anterior chamber. The donor lenticule spontaneously unfurled endothelial side down.  The anterior chamber maintainer was removed and the paracentesis and corneal wound were closed with 10-0 nylon sutures. All the knots were then buried.  A 30 gauge cannula was placed on a 5 cc syringe filled with either air or 20% SF6 gas and then passed through a paracentesis under the donor lenticule filling the anterior chamber with air or gas to tamponade the lenticule against the inner surface of the cornea.  Topical irrigation of 4 mg dexamethasone and 100 mg cephazolin was administered inferiorly in the conjunctival cul de sac. The lid speculum was removed from the eye and a shield and dressing placed over the eye.  The patient tolerated the procedure well and was to remain in a supine position in the recovery area for approximately 45 minutes before discharge   PREOPERATIVE DIAGNOSIS:  1. Cataract left eye  2. Corneal Edema left eye    POSTOPERATIVE DIAGNOSIS: 1. Cataract left eye 2. Corneal Edemaleft eye    OPERATIVE PROCEDURE:  1. Phacoemulsification/Intraocular lens implantation left eye 2. Descemet Stripping Automated Endothelial Keratoplasty left eye with capsular staining dye    IMPLANT: Donor #   [id number]       Graft size 7.75mm    LENS USED: MI60L 26.5D       PROCEDURE:  The patient was brought into the operating room and placed supine on the operating room table. After uneventful induction of neuroleptic anesthesia, a retrobulbar block consisting of 5-7 cc  of                    2% lidocaine and 0.75% marcaine was administered around the left eye. A modified van Lint style lid block was also given. The patient was then prepped and draped in the usual sterile fashion. A wire lid speculum was inserted into the operative eye giving a wide palpebral fissure. This procedure is going to be performed with an assistant surgeon whose extra hands are required to properly manage the surgery  A jamila knife was used to perform paracenteses through clear cornea at the temporal oblique meridians. Air followed by trypan was instilled into the eye. Viscoelastic was then used to maintain the anterior chamber. A keratome was used to create a clear corneal incision just inside the temporal limbus. A prebent 25 gauge needle was then used to perform an anterior capsulorrhexis. Balanced salt solution was used to hydrodissect the nucleus. The phacoemulsification unit was then used to completely phacoemulsify the nucleus. Cortical cleanup was completed with the I/A handpiece. Viscoelastic was again used to reform the anterior chamber and capsular bag.  A new foldable posterior chamber intraocular lens was brought into the surgical field. It was folded and directly injected into the capsular bag where it was centered and secured.   An additional paracentesis was created for the infusion. An anterior chamber maintainer was placed intd the paracentesis and balanced salt solution was infused to maintain the anterior chamber. Using Castroviejo calipers it was determined that the aforementioned donor size would adequately cover the posterior corneal surface avoiding angle structures. Attention was then addressed to the donor cornea.  The donor was cut to the appropriate size using a punch block and trephine, and the lenticule was covered with storage medium and set aside for later use.  Attention was then readdressed to the recipient cornea. A reverse Sinsky hook was used to score Descemet's membrane at a diameter just smaller than the intended donor lenticle. A keratome was used to create a clear corneal incision just inside the temporal limbus. A Descemet's stripping instrument was used to remove Descemet's membrane from the scored area. The keratome was then used to widen the temporal corneal wound to approximately 4.5 mm. All viscoelastic was then aspirated from the anterior segment using an aspirating handpiece. The donor lenticule was then brought into the surgical field and was loaded into a lenticule  which was placed into the corneal wound. Endoforceps were then passed through the superonasal paracentesis across the anterior chamber to the temporal corneal wound grasping the donor lenticule and advancing it into the anterior chamber. The donor lenticule spontaneously unfurled endothelial side down.  The anterior chamber maintainer was removed and the paracentesis and corneal wound were closed with 10-0 nylon sutures. All the knots were then buried.  A 30 gauge cannula was placed on a 5 cc syringe filled with either air or 20% SF6 gas and then passed through a paracentesis under the donor lenticule filling the anterior chamber with air or gas to tamponade the lenticule against the inner surface of the cornea.  Topical irrigation of 4 mg dexamethasone and 100 mg cephazolin was administered inferiorly in the conjunctival cul de sac. The lid speculum was removed from the eye and a shield and dressing placed over the eye.  The patient tolerated the procedure well and was to remain in a supine position in the recovery area for approximately 45 minutes before discharge

## 2023-02-28 ENCOUNTER — APPOINTMENT (OUTPATIENT)
Dept: OPHTHALMOLOGY | Facility: CLINIC | Age: 88
End: 2023-02-28
Payer: MEDICARE

## 2023-02-28 ENCOUNTER — NON-APPOINTMENT (OUTPATIENT)
Age: 88
End: 2023-02-28

## 2023-02-28 PROCEDURE — 99024 POSTOP FOLLOW-UP VISIT: CPT

## 2023-03-07 ENCOUNTER — NON-APPOINTMENT (OUTPATIENT)
Age: 88
End: 2023-03-07

## 2023-03-07 ENCOUNTER — APPOINTMENT (OUTPATIENT)
Dept: OPHTHALMOLOGY | Facility: CLINIC | Age: 88
End: 2023-03-07
Payer: MEDICARE

## 2023-03-07 PROCEDURE — 99024 POSTOP FOLLOW-UP VISIT: CPT

## 2023-03-15 ENCOUNTER — RX RENEWAL (OUTPATIENT)
Age: 88
End: 2023-03-15

## 2023-03-15 LAB — SURGICAL PATHOLOGY STUDY: SIGNIFICANT CHANGE UP

## 2023-03-19 LAB
CULTURE RESULTS: NO GROWTH — SIGNIFICANT CHANGE UP
SPECIMEN SOURCE: SIGNIFICANT CHANGE UP

## 2023-03-28 ENCOUNTER — APPOINTMENT (OUTPATIENT)
Dept: OPHTHALMOLOGY | Facility: CLINIC | Age: 88
End: 2023-03-28
Payer: MEDICARE

## 2023-03-28 ENCOUNTER — NON-APPOINTMENT (OUTPATIENT)
Age: 88
End: 2023-03-28

## 2023-03-28 PROBLEM — K21.9 GASTRO-ESOPHAGEAL REFLUX DISEASE WITHOUT ESOPHAGITIS: Chronic | Status: ACTIVE | Noted: 2023-02-24

## 2023-03-28 PROBLEM — E78.00 PURE HYPERCHOLESTEROLEMIA, UNSPECIFIED: Chronic | Status: ACTIVE | Noted: 2023-02-24

## 2023-03-28 PROCEDURE — 99024 POSTOP FOLLOW-UP VISIT: CPT

## 2023-03-30 ENCOUNTER — APPOINTMENT (OUTPATIENT)
Dept: INTERNAL MEDICINE | Facility: CLINIC | Age: 88
End: 2023-03-30
Payer: MEDICARE

## 2023-03-30 VITALS
HEART RATE: 66 BPM | OXYGEN SATURATION: 98 % | HEIGHT: 62 IN | WEIGHT: 135 LBS | BODY MASS INDEX: 24.84 KG/M2 | RESPIRATION RATE: 16 BRPM | SYSTOLIC BLOOD PRESSURE: 195 MMHG | DIASTOLIC BLOOD PRESSURE: 78 MMHG | TEMPERATURE: 97.7 F

## 2023-03-30 DIAGNOSIS — Z01.818 ENCOUNTER FOR OTHER PREPROCEDURAL EXAMINATION: ICD-10-CM

## 2023-03-30 DIAGNOSIS — E03.9 HYPOTHYROIDISM, UNSPECIFIED: ICD-10-CM

## 2023-03-30 DIAGNOSIS — E78.2 MIXED HYPERLIPIDEMIA: ICD-10-CM

## 2023-03-30 DIAGNOSIS — H26.9 UNSPECIFIED CATARACT: ICD-10-CM

## 2023-03-30 DIAGNOSIS — I10 ESSENTIAL (PRIMARY) HYPERTENSION: ICD-10-CM

## 2023-03-30 PROCEDURE — 99214 OFFICE O/P EST MOD 30 MIN: CPT

## 2023-03-30 NOTE — PHYSICAL EXAM
[No Acute Distress] : no acute distress [Well Nourished] : well nourished [Normal Sclera/Conjunctiva] : normal sclera/conjunctiva [Normal Outer Ear/Nose] : the outer ears and nose were normal in appearance [Normal TMs] : both tympanic membranes were normal [No JVD] : no jugular venous distention [Supple] : supple [No Respiratory Distress] : no respiratory distress  [Clear to Auscultation] : lungs were clear to auscultation bilaterally [Normal Rate] : normal rate  [No Murmur] : no murmur heard [Coordination Grossly Intact] : coordination grossly intact [Normal Gait] : normal gait [Speech Grossly Normal] : speech grossly normal [Normal Mood] : the mood was normal

## 2023-03-30 NOTE — HISTORY OF PRESENT ILLNESS
[No Pertinent Cardiac History] : no history of aortic stenosis, atrial fibrillation, coronary artery disease, recent myocardial infarction, or implantable device/pacemaker [No Pertinent Pulmonary History] : no history of asthma, COPD, sleep apnea, or smoking [No Adverse Anesthesia Reaction] : no adverse anesthesia reaction in self or family member [(Patient denies any chest pain, claudication, dyspnea on exertion, orthopnea, palpitations or syncope)] : Patient denies any chest pain, claudication, dyspnea on exertion, orthopnea, palpitations or syncope [Moderate (4-6 METs)] : Moderate (4-6 METs) [Chronic Anticoagulation] : no chronic anticoagulation [Chronic Kidney Disease] : no chronic kidney disease [Diabetes] : no diabetes [FreeTextEntry1] : L eye Sx [FreeTextEntry2] : 04/3/23 [FreeTextEntry3] : Dr Beal [FreeTextEntry4] : No nausea, vomiting, diarrhea, and constipation. No chest pain or shortness of breath.\par

## 2023-03-30 NOTE — PLAN
[FreeTextEntry1] : Patient is optimized and medically cleared for eye surgery and anesthesia with no further testing

## 2023-03-31 NOTE — ASU PATIENT PROFILE, ADULT - FALL HARM RISK - UNIVERSAL INTERVENTIONS
Bed in lowest position, wheels locked, appropriate side rails in place/Call bell, personal items and telephone in reach/Instruct patient to call for assistance before getting out of bed or chair/Non-slip footwear when patient is out of bed/[place] to call system/Physically safe environment - no spills, clutter or unnecessary equipment/Purposeful Proactive Rounding/Room/bathroom lighting operational, light cord in reach

## 2023-03-31 NOTE — ASU PATIENT PROFILE, ADULT - NSICDXPASTMEDICALHX_GEN_ALL_CORE_FT
PAST MEDICAL HISTORY:  Acid reflux     H/O: glaucoma     [place] (hard of hearing)     HTN (hypertension)     Hypercholesteremia     Hypothyroid

## 2023-03-31 NOTE — ASU PATIENT PROFILE, ADULT - NSICDXPASTSURGICALHX_GEN_ALL_CORE_FT
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

## 2023-04-03 ENCOUNTER — TRANSCRIPTION ENCOUNTER (OUTPATIENT)
Age: 88
End: 2023-04-03

## 2023-04-03 ENCOUNTER — OUTPATIENT (OUTPATIENT)
Dept: OUTPATIENT SERVICES | Facility: HOSPITAL | Age: 88
LOS: 1 days | Discharge: HOME | End: 2023-04-03
Payer: MEDICARE

## 2023-04-03 ENCOUNTER — APPOINTMENT (OUTPATIENT)
Dept: OPHTHALMOLOGY | Facility: AMBULATORY SURGERY CENTER | Age: 88
End: 2023-04-03

## 2023-04-03 VITALS
TEMPERATURE: 99 F | SYSTOLIC BLOOD PRESSURE: 157 MMHG | RESPIRATION RATE: 15 BRPM | DIASTOLIC BLOOD PRESSURE: 66 MMHG | HEART RATE: 57 BPM | OXYGEN SATURATION: 96 %

## 2023-04-03 VITALS
TEMPERATURE: 98 F | RESPIRATION RATE: 14 BRPM | DIASTOLIC BLOOD PRESSURE: 59 MMHG | HEART RATE: 61 BPM | WEIGHT: 128.75 LBS | HEIGHT: 64 IN | OXYGEN SATURATION: 98 % | SYSTOLIC BLOOD PRESSURE: 129 MMHG

## 2023-04-03 DIAGNOSIS — Z96.651 PRESENCE OF RIGHT ARTIFICIAL KNEE JOINT: Chronic | ICD-10-CM

## 2023-04-03 DIAGNOSIS — Z94.7 CORNEAL TRANSPLANT STATUS: Chronic | ICD-10-CM

## 2023-04-03 DIAGNOSIS — Z96.652 PRESENCE OF LEFT ARTIFICIAL KNEE JOINT: Chronic | ICD-10-CM

## 2023-04-03 DIAGNOSIS — Z98.890 OTHER SPECIFIED POSTPROCEDURAL STATES: Chronic | ICD-10-CM

## 2023-04-03 LAB
GRAM STN FLD: SIGNIFICANT CHANGE UP
SPECIMEN SOURCE: SIGNIFICANT CHANGE UP

## 2023-04-03 PROCEDURE — 88304 TISSUE EXAM BY PATHOLOGIST: CPT | Mod: 26

## 2023-04-03 PROCEDURE — 65756 CORNEAL TRNSPL ENDOTHELIAL: CPT | Mod: 78,LT

## 2023-04-03 RX ORDER — AMLODIPINE BESYLATE 2.5 MG/1
1 TABLET ORAL
Qty: 0 | Refills: 0 | DISCHARGE

## 2023-04-03 RX ORDER — OFLOXACIN 0.3 %
1 DROPS OPHTHALMIC (EYE)
Refills: 0 | Status: COMPLETED | OUTPATIENT
Start: 2023-04-03 | End: 2023-04-03

## 2023-04-03 RX ORDER — SIMVASTATIN 20 MG/1
1 TABLET, FILM COATED ORAL
Qty: 0 | Refills: 0 | DISCHARGE

## 2023-04-03 RX ORDER — DORZOLAMIDE HYDROCHLORIDE TIMOLOL MALEATE 20; 5 MG/ML; MG/ML
1 SOLUTION/ DROPS OPHTHALMIC
Qty: 0 | Refills: 0 | DISCHARGE

## 2023-04-03 RX ORDER — LEVOTHYROXINE SODIUM 125 MCG
1 TABLET ORAL
Qty: 0 | Refills: 0 | DISCHARGE

## 2023-04-03 RX ADMIN — Medication 1 DROP(S): at 12:35

## 2023-04-03 RX ADMIN — Medication 1 DROP(S): at 12:30

## 2023-04-03 RX ADMIN — Medication 1 DROP(S): at 12:40

## 2023-04-03 NOTE — ASU PREOP CHECKLIST - 1.
pt had surgery here in feb, ended up with a large hematoma in right hand and skin breakdown on right arm (getting better, but still healing).  Please avoid this area

## 2023-04-03 NOTE — OPERATIVE REPORT - OPERATIVE RPOSRT DETAILS
PREOPERATIVE DIAGNOSIS:  ___________  Left Eye    POSTOPERATIVE DIAGNOSIS:____________ Left Eye    OPERATIVE PROCEDURE:  Descemet Stripping Automated Endothelial Keratoplasty Left Eye    IMPLANT: Donor # [], Graft size []      PROCEDURE:  The patient was brought into the operating room and placed supine on the operating room table. After uneventful induction of neuroleptic anesthesia, a retrobulbar block consisting of 5-7 cc of                         2% lidocaine and 0.75% marcaine was administered around the left eye. A modified van Lint style lid block was also given. The patient was then prepped and draped in the usual sterile fashion. A wire lid speculum was inserted into the operative eye giving a wide palpebral fissure. This procedure is going to be performed with an assistant surgeon whose extra hands are required to properly manage the surgery  Using Castroviejo calipers it was determined that the chosen donor size would adequately cover the posterior corneal surface. A jamila knife was used to perform appropriate paracenteses through the clear cornea at the temporal and nasal oblique meridians. An anterior chamber maintainer was passed through the inferonasal paracentesis and balanced salt solution was infused to maintain the anterior chamber. Attention was then addressed to the precut donor tissue. The donor was cut to the appropriate size using a punch block and trephine, and the lenticule was covered with storage medium and set aside for later use.  Attention was then readdressed to the recipient cornea. A reverse Sinsky hook was used to score Descemet's membrane at a diameter just smaller than the intended donor lenticule. A keratome was used to create a clear corneal incision just inside the temporal limbus. A Descemet's stripping instrument was used to remove Descemet's membrane from the scored area. The keratome was then used to widen the temporal corneal wound to approximately 4.5 mm. The donor lenticule was then brought into the surgical field and loaded into a miniBusin glide  which was placed into the corneal wound. Tan endoforceps were then passed through a paracentesis across the anterior chamber to the temporal corneal wound grasping the donor lenticule and advancing it into the anterior chamber. The donor lenticule spontaneously unfurled endothelial side down.  The anterior chamber maintainer was removed and the paracentesis and corneal wound were closed with 10-0 nylon sutures. All the knots were then buried.  A 30 gauge cannula was placed on a 5 cc syringe filled with either air or 20% SF6 gas and then passed through a paracentesis under the donor lenticule filling the anterior chamber with air or gas to tamponade the lenticule against the inner surface of the cornea.  Topical irrigation of 4 mg dexamethasone and 100 mg cephazolin was administered inferiorly in the conjunctival cul de sac. The lid speculum was removed from the eye and a shield and dressing placed over the eye.  The patient tolerated the procedure well and was to remain in a supine position in the recovery area for approximately 45 minutes before discharge PREOPERATIVE DIAGNOSIS:  Failed DSAEK Left Eye    POSTOPERATIVE DIAGNOSIS: Failed DSAEK Left Eye    OPERATIVE PROCEDURE:  Descemet Stripping Automated Endothelial Keratoplasty Left Eye    IMPLANT: Donor # [[id number]], Graft size [7.75mm]      PROCEDURE:  The patient was brought into the operating room and placed supine on the operating room table. After uneventful induction of neuroleptic anesthesia, a retrobulbar block consisting of 5-7 cc of                         2% lidocaine and 0.75% marcaine was administered around the left eye. A modified van Lint style lid block was also given. The patient was then prepped and draped in the usual sterile fashion. A wire lid speculum was inserted into the operative eye giving a wide palpebral fissure. This procedure is going to be performed with an assistant surgeon whose extra hands are required to properly manage the surgery  Using Castroviejo calipers it was determined that the chosen donor size would adequately cover the posterior corneal surface. A jamila knife was used to perform appropriate paracenteses through the clear cornea at the temporal and nasal oblique meridians. An anterior chamber maintainer was passed through the inferonasal paracentesis and balanced salt solution was infused to maintain the anterior chamber. Attention was then addressed to the precut donor tissue. The donor was cut to the appropriate size using a punch block and trephine, and the lenticule was covered with storage medium and set aside for later use.  Attention was then readdressed to the recipient cornea. A reverse Sinsky hook was used to score Descemet's membrane at a diameter just smaller than the intended donor lenticule. A keratome was used to create a clear corneal incision just inside the temporal limbus. A Descemet's stripping instrument was used to remove Descemet's membrane from the scored area. The keratome was then used to widen the temporal corneal wound to approximately 4.5 mm. The donor lenticule was then brought into the surgical field and loaded into a miniBusin glide  which was placed into the corneal wound. Tan endoforceps were then passed through a paracentesis across the anterior chamber to the temporal corneal wound grasping the donor lenticule and advancing it into the anterior chamber. The donor lenticule spontaneously unfurled endothelial side down.  The anterior chamber maintainer was removed and the paracentesis and corneal wound were closed with 10-0 nylon sutures. All the knots were then buried.  A 30 gauge cannula was placed on a 5 cc syringe filled with either air or 20% SF6 gas and then passed through a paracentesis under the donor lenticule filling the anterior chamber with air or gas to tamponade the lenticule against the inner surface of the cornea.  Topical irrigation of 4 mg dexamethasone and 100 mg cephazolin was administered inferiorly in the conjunctival cul de sac. The lid speculum was removed from the eye and a shield and dressing placed over the eye.  The patient tolerated the procedure well and was to remain in a supine position in the recovery area for approximately 45 minutes before discharge

## 2023-04-03 NOTE — ASU DISCHARGE PLAN (ADULT/PEDIATRIC) - NS MD DC FALL RISK RISK
For information on Fall & Injury Prevention, visit: [email or web address] OR  [email or web address] OR  https://www.cdc.gov/steadi/patient.html

## 2023-04-04 ENCOUNTER — NON-APPOINTMENT (OUTPATIENT)
Age: 88
End: 2023-04-04

## 2023-04-04 ENCOUNTER — APPOINTMENT (OUTPATIENT)
Dept: OPHTHALMOLOGY | Facility: CLINIC | Age: 88
End: 2023-04-04
Payer: MEDICARE

## 2023-04-04 PROCEDURE — 99024 POSTOP FOLLOW-UP VISIT: CPT

## 2023-04-10 LAB — SURGICAL PATHOLOGY STUDY: SIGNIFICANT CHANGE UP

## 2023-04-11 ENCOUNTER — APPOINTMENT (OUTPATIENT)
Dept: OPHTHALMOLOGY | Facility: CLINIC | Age: 88
End: 2023-04-11
Payer: MEDICARE

## 2023-04-11 ENCOUNTER — NON-APPOINTMENT (OUTPATIENT)
Age: 88
End: 2023-04-11

## 2023-04-11 PROCEDURE — 99024 POSTOP FOLLOW-UP VISIT: CPT

## 2023-04-28 LAB
CULTURE RESULTS: NO GROWTH — SIGNIFICANT CHANGE UP
SPECIMEN SOURCE: SIGNIFICANT CHANGE UP

## 2023-05-01 ENCOUNTER — RX RENEWAL (OUTPATIENT)
Age: 88
End: 2023-05-01

## 2023-05-02 ENCOUNTER — APPOINTMENT (OUTPATIENT)
Dept: OPHTHALMOLOGY | Facility: CLINIC | Age: 88
End: 2023-05-02
Payer: MEDICARE

## 2023-05-02 ENCOUNTER — NON-APPOINTMENT (OUTPATIENT)
Age: 88
End: 2023-05-02

## 2023-05-02 PROCEDURE — 99024 POSTOP FOLLOW-UP VISIT: CPT

## 2023-06-06 ENCOUNTER — APPOINTMENT (OUTPATIENT)
Dept: OPHTHALMOLOGY | Facility: CLINIC | Age: 88
End: 2023-06-06
Payer: MEDICARE

## 2023-06-06 ENCOUNTER — NON-APPOINTMENT (OUTPATIENT)
Age: 88
End: 2023-06-06

## 2023-06-06 PROCEDURE — 99212 OFFICE O/P EST SF 10 MIN: CPT

## 2023-06-28 ENCOUNTER — RX RENEWAL (OUTPATIENT)
Age: 88
End: 2023-06-28

## 2023-07-18 ENCOUNTER — APPOINTMENT (OUTPATIENT)
Dept: OPHTHALMOLOGY | Facility: CLINIC | Age: 88
End: 2023-07-18
Payer: MEDICARE

## 2023-07-18 ENCOUNTER — NON-APPOINTMENT (OUTPATIENT)
Age: 88
End: 2023-07-18

## 2023-07-18 PROCEDURE — 92012 INTRM OPH EXAM EST PATIENT: CPT

## 2023-07-31 ENCOUNTER — RX RENEWAL (OUTPATIENT)
Age: 88
End: 2023-07-31

## 2023-07-31 RX ORDER — AMLODIPINE BESYLATE 5 MG/1
5 TABLET ORAL
Qty: 90 | Refills: 3 | Status: ACTIVE | COMMUNITY
Start: 2018-03-19 | End: 1900-01-01

## 2023-09-24 ENCOUNTER — APPOINTMENT (OUTPATIENT)
Dept: AFTER HOURS CARE | Facility: EMERGENCY ROOM | Age: 88
End: 2023-09-24
Payer: MEDICARE

## 2023-09-24 DIAGNOSIS — U07.1 COVID-19: ICD-10-CM

## 2023-09-24 PROCEDURE — 99204 OFFICE O/P NEW MOD 45 MIN: CPT | Mod: 95

## 2023-09-24 RX ORDER — NIRMATRELVIR AND RITONAVIR 300-100 MG
20 X 150 MG & KIT ORAL
Qty: 30 | Refills: 0 | Status: ACTIVE | COMMUNITY
Start: 2023-09-24 | End: 1900-01-01

## 2023-11-27 ENCOUNTER — RX RENEWAL (OUTPATIENT)
Age: 88
End: 2023-11-27

## 2023-11-27 RX ORDER — LEVOTHYROXINE SODIUM 0.05 MG/1
50 TABLET ORAL
Qty: 90 | Refills: 1 | Status: ACTIVE | COMMUNITY
Start: 2017-10-05 | End: 1900-01-01

## 2024-01-10 ENCOUNTER — APPOINTMENT (OUTPATIENT)
Dept: OPHTHALMOLOGY | Facility: CLINIC | Age: 89
End: 2024-01-10
Payer: MEDICARE

## 2024-01-10 ENCOUNTER — NON-APPOINTMENT (OUTPATIENT)
Age: 89
End: 2024-01-10

## 2024-01-10 PROCEDURE — 92012 INTRM OPH EXAM EST PATIENT: CPT

## 2024-02-08 ENCOUNTER — APPOINTMENT (OUTPATIENT)
Dept: ORTHOPEDIC SURGERY | Facility: CLINIC | Age: 89
End: 2024-02-08
Payer: MEDICARE

## 2024-02-08 DIAGNOSIS — M17.0 BILATERAL PRIMARY OSTEOARTHRITIS OF KNEE: ICD-10-CM

## 2024-02-08 DIAGNOSIS — S80.02XA CONTUSION OF LEFT KNEE, INITIAL ENCOUNTER: ICD-10-CM

## 2024-02-08 PROCEDURE — 73562 X-RAY EXAM OF KNEE 3: CPT | Mod: 50

## 2024-02-08 PROCEDURE — 99214 OFFICE O/P EST MOD 30 MIN: CPT

## 2024-02-08 NOTE — ADDENDUM
[FreeTextEntry1] : I, KATIE SNEED, acted solely as a scribe for Dr. Ronald Lomeli on this date 02/08/2024.  All medical record entries made by the Scribe were at my, Dr. Ronald Lomeli, direction and personally dictated by me on 02/08/2024. I have reviewed the chart and agree that the record accurately reflects my personal performance of the history, physical exam, assessment and plan. I have also personally directed, reviewed, and agreed with the chart.

## 2024-02-08 NOTE — DISCUSSION/SUMMARY
[de-identified] : I went over the pathophysiology of the patient's symptoms in great detail with the patient. I discussed the underlying pathophysiology of the patient's condition in great detail with the patient. I went over the patient's x-rays with them in great detail. Proper cane walking protocol was discussed and demonstrated with the patient. We advised the patient to see a Dermatologist for his left knee redness and rash.   All of their questions were answered. They understand and consent to the plan.   FU as needed.

## 2024-02-08 NOTE — PHYSICAL EXAM
[de-identified] : Constitutional o Appearance : well-nourished, well developed, alert, in no acute distress  Head and Face o Head :  Inspection : atraumatic, normocephalic o Face :  Inspection : no visible rash or discoloration Respiratory o Respiratory Effort: breathing unlabored  Neurologic o Mental Status Examination :  Orientation : alert and oriented X 3 Psychiatric o Mood and Affect: mood normal, affect appropriate Cardiovascular o Observation/Palpation : - no swelling Lymphatic o Additional Nodes : No palpable lymph nodes present  Right Lower Extremity o Buttock : no tenderness, swelling or deformities  o Right Hip :  Inspection/Palpation : no tenderness, swelling or deformities  Range of Motion : full and painless in all planes, no crepitance  Stability : joint stability intact  Strength : extension, flexion, adduction, abduction, internal rotation and external rotation, 5/5  o Right Knee :  Inspection/Palpation : no tenderness, no swelling, well-healed incision  Range of Motion : 0-120, no crepitance, good patellofemoral glide  Stability : no valgus or varus instability present on provocative testing  Strength : flexion and extension 5/5  Tests and Signs : negative Anterior Drawer  Left Lower Extremity o Buttock : no tenderness, swelling or deformities  o Left Hip :  Inspection/Palpation : no tenderness, no swelling or deformities  Range of Motion : full and painless in all planes, no crepitance  Stability : joint stability intact  Strength : extension, flexion, adduction, abduction, internal rotation and external rotation, 5/5  o Left Knee :  Inspection/Palpation : abrasion laterally, no tenderness to palpation, no swelling, well-healed incision   Range of Motion : 0-120, no crepitance, good patellofemoral glide   Stability : no valgus or varus instability present on provocative testing  Strength : flexion and extension 5/5  Tests and Signs : minimally positive Anterior Drawer  o Peripheral Vascular System :  Dorsalis Pedis Artery : pulse 2+ bilaterally  Posterior Tibial Artery : pulse 2+ bilaterally   Gait and Station: Gait: using a cane, heel/toe gait, no significant extremity swelling or lymphedema, good proprioception and balance  Radiology Results 2/8/2024  o Right Knee : Standing AP, Lateral and skyline views of the knee were obtained and revealed 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 No fracture  o Left Knee : Standing AP, Lateral and skyline views of the knee were obtained and revealed excellent position of his left total knee replacement with central tracking of the patella. No fracture

## 2024-02-08 NOTE — HISTORY OF PRESENT ILLNESS
[de-identified] : 89-year-old male presents s/p bilateral TKR, right done 2/25/20 and left done 10/27/20. He states he fell a week and a half ago. He presents with his daughter. He had agreed to use a cane at this time. His daughter states he has redness and a rash on his left knee. She states the redness and rash has spread with little bumps. The daughter states he was on medication for a rash on his left knee. She states she has left knee swelling right after the fall.   Radiology Results 03/28/2022  o Right Knee : Standing AP, Lateral and skyline views of the knee were obtained and revealed 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 o Left Knee : Standing AP, Lateral and skyline views of the knee were obtained and revealed excellent position of his left total knee replacement with central tracking of the patella.

## 2024-03-25 ENCOUNTER — RX RENEWAL (OUTPATIENT)
Age: 89
End: 2024-03-25

## 2024-03-25 RX ORDER — SIMVASTATIN 10 MG/1
10 TABLET, FILM COATED ORAL
Qty: 90 | Refills: 0 | Status: ACTIVE | COMMUNITY
Start: 2018-06-11 | End: 1900-01-01

## (undated) DEVICE — DRSG CURITY GAUZE SPONGE 4 X 4" 12-PLY

## (undated) DEVICE — CENTURION PAK FACO ACTIVE INTREPID FMS 0.9 MM ULTRA

## (undated) DEVICE — SUT NYLON 10-0 12" CU-5

## (undated) DEVICE — PUNCH TREPH DISP STRL 7.75MM

## (undated) DEVICE — KNIFE ALCON PARACENTESIS CLEARCUT SIDEPORT 1MM (YELLOW)

## (undated) DEVICE — NDL HYPO SAFE 18G X 1.5" (PINK)

## (undated) DEVICE — FILTER SYR 22 MICRONS

## (undated) DEVICE — CANNULA IRR AC CHAMBER 8MM BEND

## (undated) DEVICE — SOL IRR BAL SALT 500ML

## (undated) DEVICE — GOWN ROYAL SILK XL

## (undated) DEVICE — WARMING BLANKET LOWER ADULT

## (undated) DEVICE — SYR LUER LOK 10CC

## (undated) DEVICE — SYR LUER LOK 5CC

## (undated) DEVICE — NDL RETROBULBAR VISITEC 25X1.5

## (undated) DEVICE — STOPCOCK 4-WAY

## (undated) DEVICE — PREP BETADINE 5% STERILE OPTHALMIC SOLUTION

## (undated) DEVICE — DRAPE MEDIUM SHEET 44" X 70"

## (undated) DEVICE — CAPSULE GUARD I/A

## (undated) DEVICE — VENODYNE/SCD SLEEVE CALF MEDIUM

## (undated) DEVICE — CATARACT KIT

## (undated) DEVICE — CULTURESWAB WITHOUT CHARCOAL

## (undated) DEVICE — DRAPE MICROSCOPE KNOB COVER SMALL (2 PCS)

## (undated) DEVICE — Device

## (undated) DEVICE — TUBING ALARIS PUMP MODULE NON-DEHP

## (undated) DEVICE — SPEAR CELLULOSE 40410

## (undated) DEVICE — APPLICATOR COTTON TIP 6"

## (undated) DEVICE — KNIFE ALCON STANDARD FULL HANDLE 15 DEG (PINK)

## (undated) DEVICE — ACM SELF RETAINING 20G

## (undated) DEVICE — GLV 7.5 PROTEXIS (WHITE)

## (undated) DEVICE — MARKING PEN DEVON DUAL TIP W RULER

## (undated) DEVICE — SYR LUER LOK 3CC

## (undated) DEVICE — PETRI DISH MED 3.5"

## (undated) DEVICE — KNIFE FULL HANDLE ANGLE 2.75MM

## (undated) DEVICE — DRSG TAPE TRANSPORE 1"

## (undated) DEVICE — NDL HYPO SAFE 25G X 5/8" (ORANGE)

## (undated) DEVICE — PACK ANTERIOR SEGMENT

## (undated) DEVICE — NDL HYPO NONSAFE 30G X 0.5" (BEIGE)

## (undated) DEVICE — KIT CENTURION ANTERIOR

## (undated) DEVICE — PACK CENTURION 2.4MM